# Patient Record
Sex: FEMALE | Race: WHITE | Employment: FULL TIME | URBAN - METROPOLITAN AREA
[De-identification: names, ages, dates, MRNs, and addresses within clinical notes are randomized per-mention and may not be internally consistent; named-entity substitution may affect disease eponyms.]

---

## 2018-03-20 ENCOUNTER — APPOINTMENT (OUTPATIENT)
Dept: CT IMAGING | Age: 73
End: 2018-03-20
Attending: EMERGENCY MEDICINE
Payer: MEDICARE

## 2018-03-20 ENCOUNTER — APPOINTMENT (OUTPATIENT)
Dept: GENERAL RADIOLOGY | Age: 73
End: 2018-03-20
Attending: EMERGENCY MEDICINE
Payer: MEDICARE

## 2018-03-20 ENCOUNTER — HOSPITAL ENCOUNTER (OUTPATIENT)
Age: 73
Setting detail: OBSERVATION
Discharge: HOME | End: 2018-03-23
Attending: EMERGENCY MEDICINE | Admitting: HOSPITALIST
Payer: MEDICARE

## 2018-03-20 ENCOUNTER — APPOINTMENT (OUTPATIENT)
Dept: ULTRASOUND IMAGING | Age: 73
End: 2018-03-20
Attending: HOSPITALIST
Payer: MEDICARE

## 2018-03-20 DIAGNOSIS — R07.9 CHEST PAIN, UNSPECIFIED TYPE: ICD-10-CM

## 2018-03-20 DIAGNOSIS — R09.02 HYPOXIA: Primary | ICD-10-CM

## 2018-03-20 DIAGNOSIS — R11.0 NAUSEA IN ADULT: ICD-10-CM

## 2018-03-20 DIAGNOSIS — R16.1 SPLENOMEGALY: ICD-10-CM

## 2018-03-20 DIAGNOSIS — R93.89 INFILTRATE NOTED ON IMAGING STUDY: ICD-10-CM

## 2018-03-20 DIAGNOSIS — E66.01 OBESITY, MORBID (HCC): ICD-10-CM

## 2018-03-20 DIAGNOSIS — R68.81 EARLY SATIETY: ICD-10-CM

## 2018-03-20 DIAGNOSIS — M19.90 ARTHRITIS: ICD-10-CM

## 2018-03-20 PROBLEM — R77.8 ELEVATED TROPONIN I LEVEL: Status: ACTIVE | Noted: 2018-03-20

## 2018-03-20 PROBLEM — J96.00 ACUTE RESPIRATORY FAILURE (HCC): Status: ACTIVE | Noted: 2018-03-20

## 2018-03-20 PROBLEM — J18.9 CAP (COMMUNITY ACQUIRED PNEUMONIA): Status: ACTIVE | Noted: 2018-03-20

## 2018-03-20 LAB
ALBUMIN SERPL-MCNC: 3.7 G/DL (ref 3.2–4.6)
ALBUMIN/GLOB SERPL: 1.2 {RATIO} (ref 1.2–3.5)
ALP SERPL-CCNC: 84 U/L (ref 50–136)
ALT SERPL-CCNC: 32 U/L (ref 12–65)
ANION GAP SERPL CALC-SCNC: 10 MMOL/L (ref 7–16)
ARTERIAL PATENCY WRIST A: POSITIVE
AST SERPL-CCNC: 33 U/L (ref 15–37)
ATRIAL RATE: 79 BPM
BASE DEFICIT BLDA-SCNC: 2 MMOL/L (ref 0–2)
BASOPHILS # BLD: 0 K/UL (ref 0–0.2)
BASOPHILS NFR BLD: 0 % (ref 0–2)
BDY SITE: ABNORMAL
BILIRUB SERPL-MCNC: 0.6 MG/DL (ref 0.2–1.1)
BNP SERPL-MCNC: 15 PG/ML
BUN SERPL-MCNC: 20 MG/DL (ref 8–23)
CALCIUM SERPL-MCNC: 9.4 MG/DL (ref 8.3–10.4)
CALCULATED P AXIS, ECG09: 72 DEGREES
CALCULATED R AXIS, ECG10: 73 DEGREES
CALCULATED T AXIS, ECG11: 58 DEGREES
CHLORIDE SERPL-SCNC: 104 MMOL/L (ref 98–107)
CO2 SERPL-SCNC: 24 MMOL/L (ref 21–32)
CREAT SERPL-MCNC: 0.66 MG/DL (ref 0.6–1)
D DIMER PPP FEU-MCNC: 1.1 UG/ML(FEU)
DIAGNOSIS, 93000: NORMAL
DIFFERENTIAL METHOD BLD: ABNORMAL
EOSINOPHIL # BLD: 0.3 K/UL (ref 0–0.8)
EOSINOPHIL NFR BLD: 6 % (ref 0.5–7.8)
ERYTHROCYTE [DISTWIDTH] IN BLOOD BY AUTOMATED COUNT: 14 % (ref 11.9–14.6)
FLUAV AG NPH QL IA: NEGATIVE
FLUBV AG NPH QL IA: NEGATIVE
GLOBULIN SER CALC-MCNC: 3 G/DL (ref 2.3–3.5)
GLUCOSE SERPL-MCNC: 139 MG/DL (ref 65–100)
HCO3 BLDA-SCNC: 22 MMOL/L (ref 22–26)
HCT VFR BLD AUTO: 38.4 % (ref 35.8–46.3)
HETEROPH AB SER QL: NEGATIVE
HGB BLD-MCNC: 12.5 G/DL (ref 11.7–15.4)
HIV1 P24 AG SERPL QL IA: NONREACTIVE
HIV1+2 AB SERPL QL IA: NONREACTIVE
IMM GRANULOCYTES # BLD: 0 K/UL (ref 0–0.5)
IMM GRANULOCYTES NFR BLD AUTO: 0 % (ref 0–5)
LIPASE SERPL-CCNC: 98 U/L (ref 73–393)
LYMPHOCYTES # BLD: 0.8 K/UL (ref 0.5–4.6)
LYMPHOCYTES NFR BLD: 17 % (ref 13–44)
MCH RBC QN AUTO: 26.5 PG (ref 26.1–32.9)
MCHC RBC AUTO-ENTMCNC: 32.6 G/DL (ref 31.4–35)
MCV RBC AUTO: 81.5 FL (ref 79.6–97.8)
MONOCYTES # BLD: 0.4 K/UL (ref 0.1–1.3)
MONOCYTES NFR BLD: 10 % (ref 4–12)
NEUTS SEG # BLD: 3 K/UL (ref 1.7–8.2)
NEUTS SEG NFR BLD: 67 % (ref 43–78)
P-R INTERVAL, ECG05: 158 MS
PCO2 BLDA: 36 MMHG (ref 35–45)
PH BLDA: 7.4 [PH] (ref 7.35–7.45)
PLATELET # BLD AUTO: 129 K/UL (ref 150–450)
PMV BLD AUTO: 10.6 FL (ref 10.8–14.1)
PO2 BLDA: 63 MMHG (ref 80–105)
POTASSIUM SERPL-SCNC: 4.1 MMOL/L (ref 3.5–5.1)
PROCALCITONIN SERPL-MCNC: <0.1 NG/ML
PROT SERPL-MCNC: 6.7 G/DL (ref 6.3–8.2)
Q-T INTERVAL, ECG07: 382 MS
QRS DURATION, ECG06: 88 MS
QTC CALCULATION (BEZET), ECG08: 438 MS
RBC # BLD AUTO: 4.71 M/UL (ref 4.05–5.25)
SERVICE CMNT-IMP: ABNORMAL
SODIUM SERPL-SCNC: 138 MMOL/L (ref 136–145)
TROPONIN I BLD-MCNC: 0 NG/ML (ref 0.02–0.05)
TROPONIN I BLD-MCNC: 0 NG/ML (ref 0.02–0.05)
TROPONIN I SERPL-MCNC: <0.04 NG/ML (ref 0.02–0.05)
TROPONIN I SERPL-MCNC: <0.04 NG/ML (ref 0.02–0.05)
VENTRICULAR RATE, ECG03: 79 BPM
WBC # BLD AUTO: 4.6 K/UL (ref 4.3–11.1)

## 2018-03-20 PROCEDURE — 74011636320 HC RX REV CODE- 636/320: Performed by: EMERGENCY MEDICINE

## 2018-03-20 PROCEDURE — 83690 ASSAY OF LIPASE: CPT | Performed by: EMERGENCY MEDICINE

## 2018-03-20 PROCEDURE — 77010033678 HC OXYGEN DAILY

## 2018-03-20 PROCEDURE — 74011250636 HC RX REV CODE- 250/636: Performed by: EMERGENCY MEDICINE

## 2018-03-20 PROCEDURE — 84484 ASSAY OF TROPONIN QUANT: CPT

## 2018-03-20 PROCEDURE — 92610 EVALUATE SWALLOWING FUNCTION: CPT

## 2018-03-20 PROCEDURE — 74011250636 HC RX REV CODE- 250/636: Performed by: HOSPITALIST

## 2018-03-20 PROCEDURE — 71045 X-RAY EXAM CHEST 1 VIEW: CPT

## 2018-03-20 PROCEDURE — 96365 THER/PROPH/DIAG IV INF INIT: CPT

## 2018-03-20 PROCEDURE — 93306 TTE W/DOPPLER COMPLETE: CPT

## 2018-03-20 PROCEDURE — 74011250637 HC RX REV CODE- 250/637: Performed by: HOSPITALIST

## 2018-03-20 PROCEDURE — 74011250637 HC RX REV CODE- 250/637: Performed by: EMERGENCY MEDICINE

## 2018-03-20 PROCEDURE — 99218 HC RM OBSERVATION: CPT

## 2018-03-20 PROCEDURE — 96372 THER/PROPH/DIAG INJ SC/IM: CPT

## 2018-03-20 PROCEDURE — 93005 ELECTROCARDIOGRAM TRACING: CPT | Performed by: EMERGENCY MEDICINE

## 2018-03-20 PROCEDURE — 74011000302 HC RX REV CODE- 302: Performed by: HOSPITALIST

## 2018-03-20 PROCEDURE — 93970 EXTREMITY STUDY: CPT

## 2018-03-20 PROCEDURE — 96374 THER/PROPH/DIAG INJ IV PUSH: CPT | Performed by: EMERGENCY MEDICINE

## 2018-03-20 PROCEDURE — 82803 BLOOD GASES ANY COMBINATION: CPT

## 2018-03-20 PROCEDURE — 81003 URINALYSIS AUTO W/O SCOPE: CPT | Performed by: EMERGENCY MEDICINE

## 2018-03-20 PROCEDURE — 87040 BLOOD CULTURE FOR BACTERIA: CPT | Performed by: EMERGENCY MEDICINE

## 2018-03-20 PROCEDURE — 84145 PROCALCITONIN (PCT): CPT | Performed by: HOSPITALIST

## 2018-03-20 PROCEDURE — 87804 INFLUENZA ASSAY W/OPTIC: CPT | Performed by: EMERGENCY MEDICINE

## 2018-03-20 PROCEDURE — 65390000012 HC CONDITION CODE 44 OBSERVATION

## 2018-03-20 PROCEDURE — G8998 SWALLOW D/C STATUS: HCPCS

## 2018-03-20 PROCEDURE — 86580 TB INTRADERMAL TEST: CPT | Performed by: HOSPITALIST

## 2018-03-20 PROCEDURE — 74011000258 HC RX REV CODE- 258: Performed by: EMERGENCY MEDICINE

## 2018-03-20 PROCEDURE — 96375 TX/PRO/DX INJ NEW DRUG ADDON: CPT | Performed by: EMERGENCY MEDICINE

## 2018-03-20 PROCEDURE — 87389 HIV-1 AG W/HIV-1&-2 AB AG IA: CPT | Performed by: HOSPITALIST

## 2018-03-20 PROCEDURE — 85379 FIBRIN DEGRADATION QUANT: CPT | Performed by: EMERGENCY MEDICINE

## 2018-03-20 PROCEDURE — 83880 ASSAY OF NATRIURETIC PEPTIDE: CPT | Performed by: EMERGENCY MEDICINE

## 2018-03-20 PROCEDURE — 80074 ACUTE HEPATITIS PANEL: CPT | Performed by: HOSPITALIST

## 2018-03-20 PROCEDURE — 99285 EMERGENCY DEPT VISIT HI MDM: CPT | Performed by: EMERGENCY MEDICINE

## 2018-03-20 PROCEDURE — 85025 COMPLETE CBC W/AUTO DIFF WBC: CPT | Performed by: EMERGENCY MEDICINE

## 2018-03-20 PROCEDURE — 86308 HETEROPHILE ANTIBODY SCREEN: CPT | Performed by: HOSPITALIST

## 2018-03-20 PROCEDURE — 94760 N-INVAS EAR/PLS OXIMETRY 1: CPT

## 2018-03-20 PROCEDURE — G8996 SWALLOW CURRENT STATUS: HCPCS

## 2018-03-20 PROCEDURE — 86644 CMV ANTIBODY: CPT | Performed by: HOSPITALIST

## 2018-03-20 PROCEDURE — 86738 MYCOPLASMA ANTIBODY: CPT | Performed by: HOSPITALIST

## 2018-03-20 PROCEDURE — 80053 COMPREHEN METABOLIC PANEL: CPT | Performed by: EMERGENCY MEDICINE

## 2018-03-20 PROCEDURE — 71260 CT THORAX DX C+: CPT

## 2018-03-20 PROCEDURE — 36600 WITHDRAWAL OF ARTERIAL BLOOD: CPT

## 2018-03-20 PROCEDURE — 84484 ASSAY OF TROPONIN QUANT: CPT | Performed by: HOSPITALIST

## 2018-03-20 PROCEDURE — 36415 COLL VENOUS BLD VENIPUNCTURE: CPT | Performed by: HOSPITALIST

## 2018-03-20 PROCEDURE — 76700 US EXAM ABDOM COMPLETE: CPT

## 2018-03-20 PROCEDURE — G8997 SWALLOW GOAL STATUS: HCPCS

## 2018-03-20 PROCEDURE — 96367 TX/PROPH/DG ADDL SEQ IV INF: CPT

## 2018-03-20 RX ORDER — ONDANSETRON 2 MG/ML
4 INJECTION INTRAMUSCULAR; INTRAVENOUS
Status: COMPLETED | OUTPATIENT
Start: 2018-03-20 | End: 2018-03-20

## 2018-03-20 RX ORDER — HYDROCODONE BITARTRATE AND ACETAMINOPHEN 5; 325 MG/1; MG/1
1 TABLET ORAL
Status: DISCONTINUED | OUTPATIENT
Start: 2018-03-20 | End: 2018-03-23 | Stop reason: HOSPADM

## 2018-03-20 RX ORDER — GUAIFENESIN 100 MG/5ML
324 LIQUID (ML) ORAL
Status: COMPLETED | OUTPATIENT
Start: 2018-03-20 | End: 2018-03-20

## 2018-03-20 RX ORDER — HEPARIN SODIUM 5000 [USP'U]/ML
5000 INJECTION, SOLUTION INTRAVENOUS; SUBCUTANEOUS EVERY 8 HOURS
Status: DISCONTINUED | OUTPATIENT
Start: 2018-03-20 | End: 2018-03-23 | Stop reason: HOSPADM

## 2018-03-20 RX ORDER — SODIUM CHLORIDE 0.9 % (FLUSH) 0.9 %
5-10 SYRINGE (ML) INJECTION EVERY 8 HOURS
Status: DISCONTINUED | OUTPATIENT
Start: 2018-03-20 | End: 2018-03-23 | Stop reason: HOSPADM

## 2018-03-20 RX ORDER — ONDANSETRON 2 MG/ML
4 INJECTION INTRAMUSCULAR; INTRAVENOUS
Status: DISCONTINUED | OUTPATIENT
Start: 2018-03-20 | End: 2018-03-23 | Stop reason: HOSPADM

## 2018-03-20 RX ORDER — GUAIFENESIN 600 MG/1
1200 TABLET, EXTENDED RELEASE ORAL EVERY 12 HOURS
Status: DISCONTINUED | OUTPATIENT
Start: 2018-03-20 | End: 2018-03-23 | Stop reason: HOSPADM

## 2018-03-20 RX ORDER — SODIUM CHLORIDE 0.9 % (FLUSH) 0.9 %
10 SYRINGE (ML) INJECTION
Status: COMPLETED | OUTPATIENT
Start: 2018-03-20 | End: 2018-03-20

## 2018-03-20 RX ORDER — NITROGLYCERIN 0.4 MG/1
0.4 TABLET SUBLINGUAL
Status: DISCONTINUED | OUTPATIENT
Start: 2018-03-20 | End: 2018-03-20

## 2018-03-20 RX ORDER — SODIUM CHLORIDE 0.9 % (FLUSH) 0.9 %
5-10 SYRINGE (ML) INJECTION AS NEEDED
Status: DISCONTINUED | OUTPATIENT
Start: 2018-03-20 | End: 2018-03-23 | Stop reason: HOSPADM

## 2018-03-20 RX ORDER — ACETAMINOPHEN 325 MG/1
650 TABLET ORAL
Status: DISCONTINUED | OUTPATIENT
Start: 2018-03-20 | End: 2018-03-23 | Stop reason: HOSPADM

## 2018-03-20 RX ADMIN — HEPARIN SODIUM 5000 UNITS: 5000 INJECTION, SOLUTION INTRAVENOUS; SUBCUTANEOUS at 21:10

## 2018-03-20 RX ADMIN — ONDANSETRON 4 MG: 2 INJECTION INTRAMUSCULAR; INTRAVENOUS at 06:45

## 2018-03-20 RX ADMIN — HEPARIN SODIUM 5000 UNITS: 5000 INJECTION, SOLUTION INTRAVENOUS; SUBCUTANEOUS at 13:18

## 2018-03-20 RX ADMIN — NITROGLYCERIN 0.5 INCH: 20 OINTMENT TOPICAL at 06:58

## 2018-03-20 RX ADMIN — TUBERCULIN PURIFIED PROTEIN DERIVATIVE 5 UNITS: 5 INJECTION, SOLUTION INTRADERMAL at 13:18

## 2018-03-20 RX ADMIN — AZITHROMYCIN MONOHYDRATE 500 MG: 500 INJECTION, POWDER, LYOPHILIZED, FOR SOLUTION INTRAVENOUS at 11:41

## 2018-03-20 RX ADMIN — SODIUM CHLORIDE 100 ML: 900 INJECTION, SOLUTION INTRAVENOUS at 08:27

## 2018-03-20 RX ADMIN — ASPIRIN 81 MG 324 MG: 81 TABLET ORAL at 06:47

## 2018-03-20 RX ADMIN — Medication 10 ML: at 13:18

## 2018-03-20 RX ADMIN — IOPAMIDOL 100 ML: 755 INJECTION, SOLUTION INTRAVENOUS at 08:27

## 2018-03-20 RX ADMIN — CEFTRIAXONE 1 G: 1 INJECTION, POWDER, FOR SOLUTION INTRAMUSCULAR; INTRAVENOUS at 10:48

## 2018-03-20 RX ADMIN — GUAIFENESIN 1200 MG: 600 TABLET, EXTENDED RELEASE ORAL at 13:17

## 2018-03-20 RX ADMIN — Medication 10 ML: at 21:10

## 2018-03-20 RX ADMIN — Medication 10 ML: at 08:28

## 2018-03-20 RX ADMIN — GUAIFENESIN 1200 MG: 600 TABLET, EXTENDED RELEASE ORAL at 21:09

## 2018-03-20 NOTE — ED NOTES
TRANSFER - OUT REPORT:    Verbal report given to Gabe Pierce on Alexus Barrios  being transferred to [facility] for routine progression of care       Report consisted of patients Situation, Background, Assessment and   Recommendations(SBAR). Information from the following report(s) SBAR, ED Summary and MAR was reviewed with the receiving nurse. Lines:   Peripheral IV 03/20/18 Right Antecubital (Active)   Site Assessment Clean, dry, & intact 3/20/2018  6:45 AM   Phlebitis Assessment 0 3/20/2018  6:45 AM   Infiltration Assessment 0 3/20/2018  6:45 AM   Dressing Status Clean, dry, & intact 3/20/2018  6:45 AM        Opportunity for questions and clarification was provided. Patient transported with:   Registered nurse, O2, and monitor.

## 2018-03-20 NOTE — PROGRESS NOTES
Case management at bedside to meet with patient and wife for a scheduled 11:00 meeting. Attempted to contact wife, no answer. Will contact son shortly if wife has not arrived.

## 2018-03-20 NOTE — H&P
HOSPITALIST H&P/CONSULT  NAME:  Pasco Holter   Age:  67 y.o.  :   1945   DOS:               18  MRN:   [id number]  PCP: Bernadette Bledsoe MD  Consulting MD:  Treatment Team: Attending Provider: Court Carl MD    HPI:   Ms. Priscila Mcmillan is a 68 yo F with PMHx of OA  And Obesity, although not on any medications at home who works at [employer] lab and presented to ED complaining of sudden right shoulder blade pain that lasted approx 15 min, 6/10 in intensity with radiation on her back and was associated with mild nausea. Complains laos of nonproductive cough for last month and some coughing spells with food intake. Complains laos om some SOB and occasional mild LE edema. Complained laos of chest heaviness, 5/10, w/o radiation that lasted less than 5 min. Denies any spikes of fever. ED work-up showed WBC: 4.6, Plts:126, BNP:15, Trop I:0, D-dimer:1.10. CXR w/o acute cardiopulmonary disease. CTA chest w/o PE, patchy airspace disease in the upper lung fields, possible pneumonitis and marked splenomegaly. Had hypoxia 88% on Ra and  Was placed on O2 supplementation. Started on Rocephin/Azithromycin. 10+ point ROS done and is negative except as noted in HPI.   Past Medical History:   Diagnosis Date    Arthritis     OA- knees    GERD (gastroesophageal reflux disease)     diet controlled - very rare    H/O detached retina repair     right eye    Macular hole of right eye     Nausea & vomiting     after anesthesia-none with  eye surgery-given benadryl in preop for post nasal drip with success    Obesity     Status post total left knee replacement 2015    Unspecified adverse effect of anesthesia     takes longer to wake- pt states low tolerance for medications      Past Surgical History:   Procedure Laterality Date    HX APPENDECTOMY      with hysterectomy    HX CATARACT REMOVAL  10/20/11    bilateral with lens implant    HX HYSTERECTOMY      partial hysterectomy & appendectomy    HX RETINAL DETACHMENT REPAIR      right side    HX RETINAL DETACHMENT REPAIR      macular hole repair right side     HX RETINAL DETACHMENT REPAIR      left macular hole 13    HX TUBAL LIGATION        None     Home meds reconciled. Allergies   Allergen Reactions    Augmentin [Amoxicillin-Pot Clavulanate] Nausea and Vomiting      Social History   Substance Use Topics    Smoking status: Former Smoker     Packs/day: 1.00     Years: 20.00     Quit date: 1984    Smokeless tobacco: Never Used    Alcohol use No      Family History   Problem Relation Age of Onset    Diabetes Mother      late onset type 2    Cancer Mother      kidney    Hypertension Father     Heart Disease Father      CABG    Cancer Brother      pancreatic, bowel      I personally reviewed home medications, social and family history.      Immunization History   Administered Date(s) Administered    Pneumococcal Polysaccharide (PPSV-23) 2015    TB Skin Test (PPD) Intradermal 2015     Objective:     Patient Vitals for the past 24 hrs:   Temp Pulse Resp BP SpO2   18 1025 97.8 °F (36.6 °C) 81 23 126/59 97 %   18 1004 - 73 23 - (!) 89 %   18 1000 - 75 17 117/60 90 %   18 0955 - 73 17 - 90 %   18 0916 - 76 17 - 91 %   18 0903 - 75 25 - (!) 88 %   18 0859 - 74 18 - (!) 89 %   18 0818 - 70 19 - (!) 89 %   18 0756 - 74 25 - 90 %   18 0720 - 80 20 112/53 93 %   18 0700 - 78 19 116/57 92 %   18 0658 - 92 - 115/58 -   18 0653 - 77 - 115/58 94 %   18 0633 97.9 °F (36.6 °C) 78 20 148/63 96 %     Temp (24hrs), Av.9 °F (36.6 °C), Min:97.8 °F (36.6 °C), Max:97.9 °F (36.6 °C)    Oxygen Therapy  O2 Sat (%): 97 % (18 1025)  Pulse via Oximetry: 75 beats per minute (18 1004)  O2 Device: Nasal cannula (2L) (18 1025)  Oxygen Therapy  O2 Sat (%): 97 % (18 1025)  Pulse via Oximetry: 75 beats per minute (18 1004)  O2 Device: Nasal cannula (2L) (03/20/18 1025)    Physical Exam:  General:         Alert, cooperative, no acute distress. Afebrile. Obese  HEENT:               NCAT. No obvious deformity. Nares normal. No drainage  Lungs:                      CTABL. No wheezing/rhonchi/rales  NC 2L  Cardiovascular:   RRR. No m/r/g. Trace/1+ pitting pedal edema b/l. +2 PT/DT pulses b/l. Abdomen:       S/nt/nd. Bowel sounds normal. .   Skin:         No rashes or lesions. Not Jaundiced  Neurologic:    AAOx3. CN II- XII grossly WNL. No gross focal deficit. Moves all extremities. Psychiatric:         Good mood. Normal affect. Denies any SI/HI.        Data Review:   Recent Results (from the past 24 hour(s))   EKG, 12 LEAD, INITIAL    Collection Time: 03/20/18  6:33 AM   Result Value Ref Range    Ventricular Rate 79 BPM    Atrial Rate 79 BPM    P-R Interval 158 ms    QRS Duration 88 ms    Q-T Interval 382 ms    QTC Calculation (Bezet) 438 ms    Calculated P Axis 72 degrees    Calculated R Axis 73 degrees    Calculated T Axis 58 degrees    Diagnosis       Normal sinus rhythm  RSR' or QR pattern in V1 suggests right ventricular conduction delay  Borderline ECG  When compared with ECG of 16-NOV-2015 14:02,  RSR' pattern in V1 is now Present  Confirmed by [facility] LEILANI OCHOA (), JOSE DE JESUS MAGALLANES ([id number]) on 3/20/2018 7:46:53 AM     POC TROPONIN-I    Collection Time: 03/20/18  6:39 AM   Result Value Ref Range    Troponin-I (POC) 0 (L) 0.02 - 0.05 ng/ml   BNP    Collection Time: 03/20/18  6:40 AM   Result Value Ref Range    BNP 15 pg/mL   CBC WITH AUTOMATED DIFF    Collection Time: 03/20/18  6:40 AM   Result Value Ref Range    WBC 4.6 4.3 - 11.1 K/uL    RBC 4.71 4.05 - 5.25 M/uL    HGB 12.5 11.7 - 15.4 g/dL    HCT 38.4 35.8 - 46.3 %    MCV 81.5 79.6 - 97.8 FL    MCH 26.5 26.1 - 32.9 PG    MCHC 32.6 31.4 - 35.0 g/dL    RDW 14.0 11.9 - 14.6 %    PLATELET 565 (L) 328 - 450 K/uL    MPV 10.6 (L) 10.8 - 14.1 FL    DF AUTOMATED      NEUTROPHILS 67 43 - 78 %    LYMPHOCYTES 17 13 - 44 % MONOCYTES 10 4.0 - 12.0 %    EOSINOPHILS 6 0.5 - 7.8 %    BASOPHILS 0 0.0 - 2.0 %    IMMATURE GRANULOCYTES 0 0.0 - 5.0 %    ABS. NEUTROPHILS 3.0 1.7 - 8.2 K/UL    ABS. LYMPHOCYTES 0.8 0.5 - 4.6 K/UL    ABS. MONOCYTES 0.4 0.1 - 1.3 K/UL    ABS. EOSINOPHILS 0.3 0.0 - 0.8 K/UL    ABS. BASOPHILS 0.0 0.0 - 0.2 K/UL    ABS. IMM. GRANS. 0.0 0.0 - 0.5 K/UL   METABOLIC PANEL, COMPREHENSIVE    Collection Time: 03/20/18  6:40 AM   Result Value Ref Range    Sodium 138 136 - 145 mmol/L    Potassium 4.1 3.5 - 5.1 mmol/L    Chloride 104 98 - 107 mmol/L    CO2 24 21 - 32 mmol/L    Anion gap 10 7 - 16 mmol/L    Glucose 139 (H) 65 - 100 mg/dL    BUN 20 8 - 23 MG/DL    Creatinine 0.66 0.6 - 1.0 MG/DL    GFR est AA >60 >60 ml/min/1.73m2    GFR est non-AA >60 >60 ml/min/1.73m2    Calcium 9.4 8.3 - 10.4 MG/DL    Bilirubin, total 0.6 0.2 - 1.1 MG/DL    ALT (SGPT) 32 12 - 65 U/L    AST (SGOT) 33 15 - 37 U/L    Alk. phosphatase 84 50 - 136 U/L    Protein, total 6.7 6.3 - 8.2 g/dL    Albumin 3.7 3.2 - 4.6 g/dL    Globulin 3.0 2.3 - 3.5 g/dL    A-G Ratio 1.2 1.2 - 3.5     LIPASE    Collection Time: 03/20/18  6:40 AM   Result Value Ref Range    Lipase 98 73 - 393 U/L   D DIMER    Collection Time: 03/20/18  7:28 AM   Result Value Ref Range    D DIMER 1.10 (HH) <0.56 ug/ml(FEU)   BLOOD GAS, ARTERIAL    Collection Time: 03/20/18  9:14 AM   Result Value Ref Range    pH 7.40 7.35 - 7.45      PCO2 36 35 - 45 mmHg    PO2 63 (L) 80 - 105 mmHg    BICARBONATE 22 22 - 26 mmol/L    BASE DEFICIT 2.0 0 - 2 mmol/L    SITE RR     ALLENS TEST POSITIVE      Respiratory comment: NA at 3 20 2018 9 21 58 AM. Not read back.     POC TROPONIN-I    Collection Time: 03/20/18  9:40 AM   Result Value Ref Range    Troponin-I (POC) 0 (L) 0.02 - 0.05 ng/ml   INFLUENZA A & B AG (RAPID TEST)    Collection Time: 03/20/18 10:47 AM   Result Value Ref Range    Influenza A Ag NEGATIVE  NEG      Influenza B Ag NEGATIVE  NEG       All Micro Results     Procedure Component Value Units Date/Time    INFLUENZA A & B AG (RAPID TEST) [[id number]] Collected:  03/20/18 1047    Order Status:  Completed Specimen:  Nasopharyngeal from Nasal washing Updated:  03/20/18 1112     Influenza A Ag NEGATIVE          NEGATIVE FOR THE PRESENCE OF INFLUENZA A ANTIGEN  INFECTION DUE TO INFLUENZA A CANNOT BE RULED OUT. BECAUSE THE ANTIGEN PRESENT IN THE SAMPLE MAY BE BELOW  THE DETECTION LIMIT OF THE TEST. A NEGATIVE TEST IS PRESUMPTIVE AND IT IS RECOMMENDED THAT THESE RESULTS BE CONFIRMED BY VIRAL CULTURE OR AN FDA-CLEARED INFLUENZA A AND B MOLECULAR ASSAY. Influenza B Ag NEGATIVE          NEGATIVE FOR THE PRESENCE OF INFLUENZA B ANTIGEN  INFECTION DUE TO INFLUENZA B CANNOT BE RULED OUT. BECAUSE THE ANTIGEN PRESENT IN THE SAMPLE MAY BE BELOW  THE DETECTION LIMIT OF THE TEST. A NEGATIVE TEST IS PRESUMPTIVE AND IT IS RECOMMENDED THAT THESE RESULTS BE CONFIRMED BY VIRAL CULTURE OR AN FDA-CLEARED INFLUENZA A AND B MOLECULAR ASSAY. CULTURE, BLOOD [[id number]] Collected:  03/20/18 1047    Order Status:  Completed Specimen:  Whole Blood from Blood Updated:  03/20/18 1058    CULTURE, BLOOD [[id number]] Collected:  03/20/18 1031    Order Status:  Completed Specimen:  Whole Blood from Blood Updated:  03/20/18 1037          Imaging [facility] [employer] /Studies:  I personally reviewed all labs, imaging, and other studies this admission:  CXR Results  (Last 48 hours)               03/20/18 0645  XR CHEST PORT Final result    Impression:  IMPRESSION: No acute cardiopulmonary disease. Narrative:  EXAM:  XR CHEST PORT       INDICATION:  cp       COMPARISON:  11/19/2015       FINDINGS: A portable AP radiograph of the chest was obtained at 0647 hours. Calcified granuloma in the right lower lobe. The lungs are clear. The cardiac   and mediastinal contours and pulmonary vascularity are normal.  The bones and   soft tissues are grossly within normal limits.                 CT Results  (Last 48 hours) 03/20/18 0833  CT CHEST W CONT Final result    Impression:  IMPRESSION:     1. No CT evidence of pulmonary embolus. 2.  Patchy airspace disease in the upper lung fields, possible pneumonitis. 3.  Marked splenomegaly. Narrative:  Chest CT       INDICATION:  Chest pain and shortness of breath, elevated d-dimer       Multiple axial images were obtained through the chest during intravenous   infusion of 100mL of Isovue 370. Coronal reformats were also evaluated. Radiation dose reduction techniques were used for this study: All CT scans   performed at this facility use one or all of the following: Automated exposure   control, adjustment of the mA and/or kVp according to patient's size, iterative   reconstruction. FINDINGS: There is normal enhancement of the major pulmonary vessels. There is   no CT evidence of pulmonary embolus. There is also normal enhancement of the   aorta. There is no dissection or aneurysm. There is minimal patchy groundglass density in the upper lung fields. There are   no pulmonary masses. There are no effusions. There is no significant   mediastinal or axillary adenopathy. There are no bony lesions. There is   splenomegaly. The spleen measures at least 16 cm in length. No results found for this visit on 03/20/18. Assessment and Plan: Active Hospital Problems    Diagnosis Date Noted    Acute respiratory failure ([address] 03/20/2018    Elevated troponin I level 03/20/2018    Chest pain 03/20/2018    CAP (community acquired pneumonia) 03/20/2018    Splenomegaly 03/20/2018    GERD (gastroesophageal reflux disease)      diet controlled - very rare      Obesity, morbid ([address] 12/26/2009       PLAN  Acute respiratory failure:  - unclear etiology, although likely due to CAP  - BNP low, although with LE edema dn marked splenomegaly - will get Echocardiogram   - O2 supplementation, wean off.       CAP:  - on Rocephin/Azythromycin  - follow blood/sputum cultures  - speech evaluation to r/o aspiration due to hx of cough after eating      Chest pain:  - serial Trop I/ EKG  - initial Trop I/EKG. w/o acute pathology  -remote telemetry  - ASa 81 mg    Splenomegaly:  - massive   - unclear etiology  - check Mycoplasma, hepatitis panel, CMP, HIV (hx of possible exposure due to work enviroment), PPD, PT/INR  - check Echocardiogram  - US abdomen      GERD:  - denies - monitor      Obesity  - educated       DVT ppx: Heparin SQ    Code status: Full CODE   Estimated LOS:  2-3 days  Risk assessment:  high  Plan of care discussed with: patient. Care team.  Ms. Hortencia Sánchez will need at least 2 midnights of admission.       Bhumika Fiore MD  03/20/18

## 2018-03-20 NOTE — IP AVS SNAPSHOT
[address] 
[phone] Patient: Oralia Steele MRN: [id number] About your hospitalization You were admitted on:  March 20, 2018 You last received care in the:  [facility] You were discharged on:  March 23, 2018 Why you were hospitalized Your primary diagnosis was:  Acute Respiratory Failure (Hcc) Your diagnoses also included:  Gerd (Gastroesophageal Reflux Disease), Obesity, Morbid (Hcc), Obesity, Chest Pain, Cap (Community Acquired Pneumonia), Splenomegaly Follow-up Information Follow up With Details Comments Contact Info Albertina Sotelo MD In 3 days Dr. Devin Rodriguez MD. Repeat CBC and CMP,OFFICE WILL CALL PATIENT WITH APPT. 1520 [facility] Oscar Goyal [phone] Lew Reyes MD In 1 week OFFICE WILL CALL PATIENT WITH APPT. [address] [address] 
[phone] Patrica Sheppard MD In 5 days chest pain. needs stress test,APPT. MAR. 28th @ 2:[address]  [address] [facility] [address] 
[phone] Your Scheduled Appointments Wednesday March 28, 2018  2:15 PM EDT New Patient with Patrica Sheppard MD  
[facility] ([address] [place] [address]) 2 [facility] 
[address] [facility]  
[phone] Discharge Orders None A check saran indicates which time of day the medication should be taken. My Medications START taking these medications Instructions Each Dose to Equal  
 Morning Noon Evening Bedtime  
 aspirin 81 mg chewable tablet Your last dose was: Your next dose is: Take 1 Tab by mouth daily. 81 mg  
    
   
   
   
  
 azithromycin 250 mg tablet Commonly known as:  Francisco J Jeanine Your last dose was: Your next dose is: Take 2 Tabs by mouth daily for 3 days. 500 mg  
    
   
   
   
  
 cefdinir 300 mg capsule Commonly known as:  OMNICEF Your last dose was: Your next dose is: Take 1 Cap by mouth two (2) times a day for 5 days. 300 mg  
    
   
   
   
  
 guaiFENesin 1,200 mg Ta12 ER tablet Commonly known as:  Eric & Eric Your last dose was: Your next dose is: Take 1 Tab by mouth every twelve (12) hours for 5 days. 1200 mg  
    
   
   
   
  
 raNITIdine 150 mg tablet Commonly known as:  ZANTAC Your last dose was: Your next dose is: Take 1 Tab by mouth two (2) times a day. 150 mg Where to Get Your Medications Information on where to get these meds will be given to you by the nurse or doctor. ! Ask your nurse or doctor about these medications  
  aspirin 81 mg chewable tablet  
 azithromycin 250 mg tablet  
 cefdinir 300 mg capsule  
 guaiFENesin 1,200 mg Ta12 ER tablet  
 raNITIdine 150 mg tablet Discharge Instructions DISCHARGE SUMMARY from Nurse PATIENT INSTRUCTIONS: 
 
After general anesthesia or intravenous sedation, for 24 hours or while taking prescription Narcotics: · Limit your activities · Do not drive and operate hazardous machinery · Do not make important personal or business decisions · Do  not drink alcoholic beverages · If you have not urinated within 8 hours after discharge, please contact your surgeon on call. Report the following to your surgeon: 
· Excessive pain, swelling, redness or odor of or around the surgical area · Temperature over 100.5 · Nausea and vomiting lasting longer than 4 hours or if unable to take medications · Any signs of decreased circulation or nerve impairment to extremity: change in color, persistent  numbness, tingling, coldness or increase pain · Any questions What to do at Home: Recommended activity: Activity as tolerated, If you experience any of the above symptoms, please follow up with MD. 
 
*  Please give a list of your current medications to your Primary Care Provider. *  Please update this list whenever your medications are discontinued, doses are 
    changed, or new medications (including over-the-counter products) are added. *  Please carry medication information at all times in case of emergency situations. These are general instructions for a healthy lifestyle: No smoking/ No tobacco products/ Avoid exposure to second hand smoke Surgeon General's Warning:  Quitting smoking now greatly reduces serious risk to your health. Obesity, smoking, and sedentary lifestyle greatly increases your risk for illness A healthy diet, regular physical exercise & weight monitoring are important for maintaining a healthy lifestyle You may be retaining fluid if you have a history of heart failure or if you experience any of the following symptoms:  Weight gain of 3 pounds or more overnight or 5 pounds in a week, increased swelling in our hands or feet or shortness of breath while lying flat in bed. Please call your doctor as soon as you notice any of these symptoms; do not wait until your next office visit. Recognize signs and symptoms of STROKE: 
 
F-face looks uneven A-arms unable to move or move unevenly S-speech slurred or non-existent T-time-call 911 as soon as signs and symptoms begin-DO NOT go Back to bed or wait to see if you get better-TIME IS BRAIN. Warning Signs of HEART ATTACK Call 911 if you have these symptoms: 
? Chest discomfort. Most heart attacks involve discomfort in the center of the chest that lasts more than a few minutes, or that goes away and comes back. It can feel like uncomfortable pressure, squeezing, fullness, or pain. ? Discomfort in other areas of the upper body.  Symptoms can include pain or discomfort in one or both arms, the back, neck, jaw, or stomach. ? Shortness of breath with or without chest discomfort. ? Other signs may include breaking out in a cold sweat, nausea, or lightheadedness. Don't wait more than five minutes to call [address]! Fast action can save your life. Calling 911 is almost always the fastest way to get lifesaving treatment. Emergency Medical Services staff can begin treatment when they arrive  up to an hour sooner than if someone gets to the hospital by car. The discharge information has been reviewed with the patient. The patient verbalized understanding. Discharge medications reviewed with the patient and appropriate educational materials and side effects teaching were provided. ___________________________________________________________________________________________________________________________________Finish full course of azithromycin and Cefdinir Follow-up with PCP in 3-5 days for further work-up for your splenomegaly. Repeat CBC and CMP at that time. Toxoplasma, TB gold pending at the time of discharge Follow-up w1th hematology in 1 week. Multiple laboratory studies pending - discussed the results with them . Fifish 24h urine collection and bring it to the lab. Avoid strenuous exercise of contact sports Chest Pain: Care Instructions Your Care Instructions There are many things that can cause chest pain. Some are not serious and will get better on their own in a few days. But some kinds of chest pain need more testing and treatment. Your doctor may have recommended a follow-up visit in the next 8 to 12 hours. If you are not getting better, you may need more tests or treatment. Even though your doctor has released you, you still need to watch for any problems. The doctor carefully checked you, but sometimes problems can develop later. If you have new symptoms or if your symptoms do not get better, get medical care right away. If you have worse or different chest pain or pressure that lasts more than 5 minutes or you passed out (lost consciousness), call 911 or seek other emergency help right away. A medical visit is only one step in your treatment. Even if you feel better, you still need to do what your doctor recommends, such as going to all suggested follow-up appointments and taking medicines exactly as directed. This will help you recover and help prevent future problems. How can you care for yourself at home? · Rest until you feel better. · Take your medicine exactly as prescribed. Call your doctor if you think you are having a problem with your medicine. · Do not drive after taking a prescription pain medicine. When should you call for help? Call 911 if: 
? · You passed out (lost consciousness). ? · You have severe difficulty breathing. ? · You have symptoms of a heart attack. These may include: ¨ Chest pain or pressure, or a strange feeling in your chest. 
¨ Sweating. ¨ Shortness of breath. ¨ Nausea or vomiting. ¨ Pain, pressure, or a strange feeling in your back, neck, jaw, or upper belly or in one or both shoulders or arms. ¨ Lightheadedness or sudden weakness. ¨ A fast or irregular heartbeat. After you call 911, the  may tell you to chew 1 adult-strength or 2 to 4 low-dose aspirin. Wait for an ambulance. Do not try to drive yourself. ?Call your doctor today if: 
? · You have any trouble breathing. ? · Your chest pain gets worse. ? · You are dizzy or lightheaded, or you feel like you may faint. ? · You are not getting better as expected. ? · You are having new or different chest pain. Where can you learn more? Go to http://jyoti-david.info/. Enter A120 in the search box to learn more about \"Chest Pain: Care Instructions. \" Current as of: March 20, 2017 Content Version: 11.4 © 5491-6401 Healthwise, Incorporated.  Care instructions adapted under license by Lucina Weber (which disclaims liability or warranty for this information). If you have questions about a medical condition or this instruction, always ask your healthcare professional. [address] any warranty or liability for your use of this information. [employer] Announcement We are excited to announce that we are making your provider's discharge notes available to you in [employer]. You will see these notes when they are completed and signed by the physician that discharged you from your recent hospital stay. If you have any questions or concerns about any information you see in [employer], please call the Health Information Department where you were seen or reach out to your Primary Care Provider for more information about your plan of care. Introducing [facility]! [employer] introduces [employer] patient portal. Now you can access parts of your medical record, email your doctor's office, and request medication refills online. 1. In your internet browser, go to [email or web address] 2. Click on the First Time User? Click Here link in the Sign In box. You will see the New Member Sign Up page. 3. Enter your [employer] Access Code exactly as it appears below. You will not need to use this code after youve completed the sign-up process. If you do not sign up before the expiration date, you must request a new code. · [employer] Access Code: [email or web address] Expires: 6/19/2018  9:26 AM 
 
4. Enter the last four digits of your Social Security Number (xxxx) and Date of Birth (mm/dd/yyyy) as indicated and click Submit. You will be taken to the next sign-up page. 5. Create a [employer] ID. This will be your [employer] login ID and cannot be changed, so think of one that is secure and easy to remember. 6. Create a [employer] password. You can change your password at any time. 7. Enter your Password Reset Question and Answer. This can be used at a later time if you forget your password. 8. Enter your e-mail address. You will receive e-mail notification when new information is available in [address] 9. Click Sign Up. You can now view and download portions of your medical record. 10. Click the Download Summary menu link to download a portable copy of your medical information. If you have questions, please visit the Frequently Asked Questions section of the [employer] website. Remember, [employer] is NOT to be used for urgent needs. For medical emergencies, dial 911. Now available from your iPhone and Android! Unresulted Labs-Please follow up with your PCP about these lab tests Order Current Status BCR-ABL1, PCR, QT In process QUANTIFERON TB GOLD In process US ABD LTD In process CULTURE, BLOOD Preliminary result CULTURE, BLOOD Preliminary result MISC. LAB TEST Preliminary result PROTEIN ELEC WITH JONG, SERUM Preliminary result TOXOPLASMA GONDII AB, IGG/IGM, QT Preliminary result Providers Seen During Your Hospitalization Provider Specialty Primary office phone Marion Joyce MD Emergency Medicine [phone] Darling Campbell MD Emergency Medicine [phone] Mary Auguste MD [facility] [phone] Immunizations Administered for This Admission Name Date Influenza Vaccine (Quad) PF  Deferred () TB Skin Test (PPD) Intradermal  Deferred (), 3/20/2018 Your Primary Care Physician (PCP) Primary Care Physician Office Phone Office Fax OTHER, PHYS ** None ** ** None ** You are allergic to the following Allergen Reactions Augmentin (Amoxicillin-Pot Clavulanate) Nausea and Vomiting Recent Documentation Height Weight Breastfeeding? BMI OB Status Smoking Status 1.6 m 110.1 kg No 42.99 kg/m2 Hysterectomy Former Smoker Emergency Contacts Name Discharge Info Relation Home Work Mobile Suleiman Dhillon  Spouse [3] [phone] Patient Belongings The following personal items are in your possession at time of discharge: 
  Dental Appliances: With patient, Uppers, Partials (uppers, partial lower)  Visual Aid: Glasses, With patient      Home Medications: None   Jewelry: Watch, With patient  Clothing: Footwear, Pants, Shirt, Undergarments, At bedside    Other Valuables: Cell Phone, At bedside Discharge Instructions Attachments/References PNEUMONIA (ENGLISH) HAND-WASHING (ENGLISH) SECONDHAND SMOKE (ENGLISH) Patient Handouts Pneumonia: Care Instructions Your Care Instructions Pneumonia is an infection of the lungs. Most cases are caused by infections from bacteria or viruses. Pneumonia may be mild or very severe. If it is caused by bacteria, you will be treated with antibiotics. It may take a few weeks to a few months to recover fully from pneumonia, depending on how sick you were and whether your overall health is good. Follow-up care is a key part of your treatment and safety. Be sure to make and go to all appointments, and call your doctor if you are having problems. It's also a good idea to know your test results and keep a list of the medicines you take. How can you care for yourself at home? · Take your antibiotics exactly as directed. Do not stop taking the medicine just because you are feeling better. You need to take the full course of antibiotics. · Take your medicines exactly as prescribed. Call your doctor if you think you are having a problem with your medicine. · Get plenty of rest and sleep. You may feel weak and tired for a while, but your energy level will improve with time. · To prevent dehydration, drink plenty of fluids, enough so that your urine is light yellow or clear like water. Choose water and other caffeine-free clear liquids until you feel better.  If you have kidney, heart, or liver disease and have to limit fluids, talk with your doctor before you increase the amount of fluids you drink. · Take care of your cough so you can rest. A cough that brings up mucus from your lungs is common with pneumonia. It is one way your body gets rid of the infection. But if coughing keeps you from resting or causes severe fatigue and chest-wall pain, talk to your doctor. He or she may suggest that you take a medicine to reduce the cough. · Use a vaporizer or humidifier to add moisture to your bedroom. Follow the directions for cleaning the machine. · Do not smoke or allow others to smoke around you. Smoke will make your cough last longer. If you need help quitting, talk to your doctor about stop-smoking programs and medicines. These can increase your chances of quitting for good. · Take an over-the-counter pain medicine, such as acetaminophen (Tylenol), ibuprofen (Advil, Motrin), or naproxen (Aleve). Read and follow all instructions on the label. · Do not take two or more pain medicines at the same time unless the doctor told you to. Many pain medicines have acetaminophen, which is Tylenol. Too much acetaminophen (Tylenol) can be harmful. · If you were given a spirometer to measure how well your lungs are working, use it as instructed. This can help your doctor tell how your recovery is going. · To prevent pneumonia in the future, talk to your doctor about getting a flu vaccine (once a year) and a pneumococcal vaccine (one time only for most people). When should you call for help? Call 911 anytime you think you may need emergency care. For example, call if: 
? · You have severe trouble breathing. ?Call your doctor now or seek immediate medical care if: 
? · You cough up dark brown or bloody mucus (sputum). ? · You have new or worse trouble breathing. ? · You are dizzy or lightheaded, or you feel like you may faint. ?Watch closely for changes in your health, and be sure to contact your doctor if: 
? · You have a new or higher fever. ? · You are coughing more deeply or more often. ? · You are not getting better after 2 days (48 hours). ? · You do not get better as expected. Where can you learn more? Go to http://jyoti-david.info/. Enter [id number] in the search box to learn more about \"Pneumonia: Care Instructions. \" Current as of: May 12, 2017 Content Version: 11.4 © 2640-2436 [employer]. Care instructions adapted under license by [employer] (which disclaims liability or warranty for this information). If you have questions about a medical condition or this instruction, always ask your healthcare professional. [address] any warranty or liability for your use of this information. Hand-Washing: Care Instructions Your Care Instructions It is important for caregivers to wash their hands properly. This is the single best way to prevent the spread of infections. Hand-washing can help keep you from getting sick. It is easy, doesn't cost much, and it works. Make sure that you and your caregivers follow safe hand-washing routines. Caregivers may include health care workers or family members at home or in a care facility. You can talk to them about this information on hand-washing. Follow-up care is a key part of your treatment and safety. Be sure to make and go to all appointments, and call your doctor if you are having problems. It's also a good idea to know your test results and keep a list of the medicines you take. How can you care for yourself at home? · Caregivers should wash their hands with soap and water: ¨ When their hands are dirty, especially after being exposed to body fluids. This includes blood. ¨ When their hands may have been exposed to germs that could spread infection. ¨ After they touch broken skin, sores, or wound bandages. ¨ After they use the bathroom. · At other times, caregivers can use an alcohol-based gel  or soap and water to clean hands. This should be done: ¨ Before and after any contact with you. ¨ After they take off gloves. ¨ Before they handle a device that touches your body (even if gloves are used). ¨ After they touch any objects near you, such as medical equipment, lights, or doorknobs. ¨ Before they handle medicine or prepare food. Proper hand-washing for caregivers · When using an alcohol-based gel , fill your palm with the gel. Then spread it all over your hands. Rub your hands together until they are dry. · When washing hands with soap and water: ¨ Wet your hands with running water, and apply soap. ¨ Rub your hands together to make a lather. Scrub well for at least 20 seconds. ¨ Pay special attention to your wrists, the backs of your hands, between your fingers, and under your fingernails. ¨ Rinse your hands well under running water. ¨ Use a clean towel to dry your hands, or air-dry your hands. You may want to use a clean towel as a barrier between the faucet and your clean hands when you turn off the water. · If you use bar soap, use small bars. Set the soap on a rack that lets water drain. Where can you learn more? Go to http://jyoti-david.info/. Enter A128 in the search box to learn more about \"Hand-Washing: Care Instructions. \" Current as of: March 3, 2017 Content Version: 11.4 © 0196-2596 [employer]. Care instructions adapted under license by [employer] (which disclaims liability or warranty for this information). If you have questions about a medical condition or this instruction, always ask your healthcare professional. [place] [address] any warranty or liability for your use of this information. Secondhand Smoke: Care Instructions Your Care Instructions Secondhand smoke comes from the burning end of a cigarette, cigar, or pipe and the smoke that a smoker exhales. The smoke contains nicotine and many other harmful chemicals. Breathing secondhand smoke can cause or worsen health problems including cancer, asthma, coronary artery disease, and respiratory infections. It can make your eyes and nose burn and cause a sore throat. Secondhand smoke is especially bad for babies and young children whose lungs are still developing. Babies whose parents smoke are more likely to have ear infections, pneumonia, and bronchitis in the first few years of their lives. Secondhand smoke can make asthma symptoms worse in children. If you are pregnant, it is important that you not smoke and that you avoid secondhand smoke. You are more likely to give birth to a baby who weighs less than expected (low birth weight) if you smoke. And your baby may have a greater risk for sudden infant death syndrome (SIDS). Babies whose mothers are exposed to secondhand smoke during pregnancy have a higher risk for health problems. Follow-up care is a key part of your treatment and safety. Be sure to make and go to all appointments, and call your doctor if you are having problems. It's also a good idea to know your test results and keep a list of the medicines you take. How can you care for yourself at home? · Do not smoke or let anyone else smoke in your home. If people must smoke, ask them to go outside. · If people do smoke in your home, choose a room where you can open a window or use a fan to get the smoke outside. · Do not let anyone smoke in your car. If someone must smoke, pull over in a safe place and let him or her smoke away from the car. · Ask your employer to make sure that you have a smoke-free work area. · Make sure that your children are not exposed to secondhand smoke at day care, school, and after-school programs. · Try to choose nonsmoking bars, restaurants, and other public places when you go out. · Help your family and friends who smoke to quit by encouraging them to try. Tell them about treatment resources. Having support from others often helps. · If you smoke, quit. Quitting is hard, but there are ways to boost your chance of quitting tobacco for good. ¨ Use nicotine gum, patches, or lozenges. Call a quitline. Ask your doctor about stop-smoking programs and medicines. ¨ Keep trying. When should you call for help? Watch closely for changes in your health, and be sure to contact your doctor if you have any problems. Where can you learn more? Go to http://jyoti-david.info/. Enter L004 in the search box to learn more about \"Secondhand Smoke: Care Instructions. \" Current as of: March 20, 2017 Content Version: 11.4 © 6282-5245 Healthwise, [employer]. Care instructions adapted under license by [employer] (which disclaims liability or warranty for this information). If you have questions about a medical condition or this instruction, always ask your healthcare professional. [address] any warranty or liability for your use of this information. Please provide this summary of care documentation to your next provider. Signatures-by signing, you are acknowledging that this After Visit Summary has been reviewed with you and you have received a copy. Patient Signature:  ____________________________________________________________ Date:  ____________________________________________________________  
  
Bradley Miguel Provider Signature:  ____________________________________________________________ Date:  ____________________________________________________________

## 2018-03-20 NOTE — PHYSICIAN ADVISORY
Letter of Determination:  Outpatient status receiving Observation Services    This patient was originally hospitalized as Inpatient Status on 3/20/2018 for pneumonitis. At this time this patient does not appear to meet the medical necessity requirements in CMS regulation Section 43 .3 to support an inpatient level of care. It is our recommendation that this patient's hospitalization status should be changed from INPATIENT to [place] receiving OBSERVATION services via Condition Code 44.      This may change due to the medical condition of the patient and new clinical evidence as the patients care progreses. The final decision regarding the patient's hospitalization status depends on the attending physician's judgement.     Josue Chapin MD, [place]ING[place],   Physician Henri Thurston.

## 2018-03-20 NOTE — ED PROVIDER NOTES
HPI Comments: 2 AM patient had some right shoulder blade pain. 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 Had some mild nausea at that time. Just prior to arrival here a little after 6:00 AM patient had some chest heaviness with increased nausea. That has somewhat improved but still present. Has mild shortness of breath. No numbness or diaphoresis. No prior similar episodes. Patient is a 67 y.o. female presenting with chest pain. The history is provided by the patient. No  was used. Chest Pain (Angina)    This is a new problem. The current episode started less than 1 hour ago. The problem has not changed since onset. The problem occurs constantly. The pain is associated with normal activity. Pain location: sternal. The pain is mild. The quality of the pain is described as heavy. The pain does not radiate. Associated symptoms include nausea and shortness of breath. Pertinent negatives include no abdominal pain, no back pain, no cough, no diaphoresis, no dizziness, no exertional chest pressure, no fever, no headaches, no irregular heartbeat, no leg pain, no lower extremity edema, no malaise/fatigue, no numbness, no palpitations, no vomiting and no weakness. She has tried nothing for the symptoms. Her past medical history does not include DM or HTN.         Past Medical History:   Diagnosis Date    Arthritis     OA- knees    GERD (gastroesophageal reflux disease)     diet controlled - very rare    H/O detached retina repair     right eye    Macular hole of right eye     Nausea & vomiting     after anesthesia-none with 2/13 eye surgery-given benadryl in preop for post nasal drip with success    Obesity     Status post total left knee replacement 12/7/2015    Unspecified adverse effect of anesthesia     takes longer to wake- pt states low tolerance for medications       Past Surgical History:   Procedure Laterality Date    HX APPENDECTOMY  1980's    with hysterectomy    HX CATARACT REMOVAL 10/20/11    bilateral with lens implant    HX HYSTERECTOMY  1980's    partial hysterectomy & appendectomy    HX RETINAL DETACHMENT REPAIR      right side    HX RETINAL DETACHMENT REPAIR      macular hole repair right side     HX RETINAL DETACHMENT REPAIR      left macular hole 2/6/13    HX TUBAL LIGATION  1980's         Family History:   Problem Relation Age of Onset    Diabetes Mother      late onset type 2    Cancer Mother      kidney    Hypertension Father     Heart Disease Father      CABG    Cancer Brother      pancreatic, bowel       Social History     Social History    Marital status:      Spouse name: N/A    Number of children: N/A    Years of education: N/A     Occupational History    Not on file. Social History Main Topics    Smoking status: Former Smoker     Packs/day: 1.00     Years: 20.00     Quit date: 12/26/1984    Smokeless tobacco: Never Used    Alcohol use No    Drug use: No    Sexual activity: Not on file     Other Topics Concern    Not on file     Social History Narrative    , lives with . She works as  at [employer]. ALLERGIES: Augmentin [amoxicillin-pot clavulanate]    Review of Systems   Constitutional: Negative for chills, diaphoresis, fever and malaise/fatigue. HENT: Negative for rhinorrhea and sore throat. Eyes: Negative for pain and redness. Respiratory: Positive for shortness of breath. Negative for cough, chest tightness and wheezing. Cardiovascular: Positive for chest pain. Negative for palpitations and leg swelling. Gastrointestinal: Positive for nausea. Negative for abdominal pain, diarrhea and vomiting. Genitourinary: Negative for dysuria and hematuria. Musculoskeletal: Negative for back pain, gait problem, neck pain and neck stiffness. Skin: Negative for color change and rash. Neurological: Negative for dizziness, weakness, numbness and headaches. There were no vitals filed for this visit. Physical Exam   Constitutional: She is oriented to person, place, and time. She appears well-developed and well-nourished. HENT:   Head: Normocephalic and atraumatic. Neck: Normal range of motion. Neck supple. Cardiovascular: Normal rate and regular rhythm. No murmur heard. Pulmonary/Chest: Effort normal and breath sounds normal. She has no wheezes. She exhibits no tenderness. Abdominal: Soft. Bowel sounds are normal. There is no tenderness. Musculoskeletal: Normal range of motion. She exhibits edema (mild bilateral). Neurological: She is alert and oriented to person, place, and time. Skin: Skin is warm and dry. Nursing note and vitals reviewed. MDM  Number of Diagnoses or Management Options  Diagnosis management comments: Will have on coming doctor follow up on results. 10:06 AM  CT scan for PE was negative. 2 troponins was negative. BNP was negative. However, patient continues to be hypoxic on room air so I will plan to admit her for observation. Amount and/or Complexity of Data Reviewed  Clinical lab tests: ordered and reviewed  Tests in the radiology section of CPT®: ordered and reviewed  Tests in the medicine section of CPT®: ordered and reviewed    Patient Progress  Patient progress: stable        ED Course       Procedures      EKG: normal sinus rhythm, nonspecific ST and T waves changes. Rate 79.

## 2018-03-20 NOTE — PROGRESS NOTES
Speech language pathology: bedside swallow note: Initial Assessment and Discharge    NAME/AGE/GENDER: Jessica Prado is a 67 y.o. female  DATE: 3/20/2018  PRIMARY DIAGNOSIS: Acute respiratory failure ([address]       ICD-10: Treatment Diagnosis: R13.12 Oropharyngeal Dysphagia. INTERDISCIPLINARY COLLABORATION: Registered Nurse  PRECAUTIONS/ALLERGIES: Augmentin [amoxicillin-pot clavulanate] ASSESSMENT:Based on the objective data described below, Ms. Giselle Moise presents with swallow function that is within normal limits. Per chart review, patient reported coughing when eating food. However, during initial interview with clinician, patient denies coughing with po intake. She states she will have an occasional cough at end of meal, but does not feel it is related to po intake. She has history of GERD. Patient tolerated thin liquid via serial cup and straw sips, puree, mixed, and solid consistencies. Appropriate oral prep and timely swallow initiation with all trials. No oral residue with puree or chewable textures. No overt signs or symptoms of airway compromise noted  Recommend regular diet and thin liquids. Medications whole with liquid wash. No further speech therapy indicated as swallow function is within normal limits. ?????? ? ? This section established at most recent assessment??????????  PROBLEM LIST (Impairments causing functional limitations):  1. Oropharyngeal dysphagia- no symptoms identified  REHABILITATION POTENTIAL FOR STATED GOALS: Excellent  PLAN OF CARE:   Patient will benefit from skilled intervention to address the following impairments.   RECOMMENDATIONS AND PLANNED INTERVENTIONS (Benefits and precautions of therapy have been discussed with the patient.):  · PO:  Regular  · Liquids:  regular thin  MEDICATIONS:  · With liquid  COMPENSATORY STRATEGIES/MODIFICATIONS INCLUDING:  · None  OTHER RECOMMENDATIONS (including follow up treatment recommendations):   · none  RECOMMENDED DIET MODIFICATIONS DISCUSSED WITH:  · Nursing  · Patient  FREQUENCY/DURATION: swallow function is within normal limits. Recommend regular diet/thin liquids. No further speech therapy indicated at this time. RECOMMENDED REHABILITATION/EQUIPMENT: (at time of discharge pending progress): Due to the probability of continued deficits (see above) this patient will not likely need continued skilled speech therapy after discharge. SUBJECTIVE:   \"I don't know where that came from. I swallow just fine! \"  History of Present Injury/Illness: Ms. Giselle Moise  has a past medical history of Arthritis; GERD (gastroesophageal reflux disease); H/O detached retina repair; Macular hole of right eye; Nausea & vomiting; Obesity; Status post total left knee replacement (12/7/2015); and Unspecified adverse effect of anesthesia. She also has no past medical history of Aneurysm ([address] Arrhythmia; Asthma; Autoimmune disease ([address] Cancer ([address] Chronic kidney disease; Chronic obstructive pulmonary disease ([address] Chronic pain; Coagulation disorder ([address] Diabetes ([address] Difficult intubation; Heart failure ([address] Hypertension; Liver disease; Malignant hyperthermia due to anesthesia; Pseudocholinesterase deficiency; Psychiatric disorder; PUD (peptic ulcer disease); Seizures ([address] Sleep apnea; Stroke [facility]); Thromboembolus ([address] or Thyroid disease. .  She also  has a past surgical history that includes hx hysterectomy (1980's); hx tubal ligation (1980's); hx appendectomy (1980's); hx cataract removal (10/20/11); hx retinal detachment repair; hx retinal detachment repair; and hx retinal detachment repair. Present Symptoms: No dysphagia symptoms identified   Pain Intensity 1: 0  Pain Location 1: Chest  Current Medications:   No current facility-administered medications on file prior to encounter. No current outpatient prescriptions on file prior to encounter.      Current Dietary Status:  NPO pending speech eval      Social History/Home Situation:    Home Environment: Private residence  # Steps to Enter:  (ramp)  One/Two Story Residence: Two story  Living Alone: No  Support Systems: Child(val), Confucianism / leeann community, Friends \ neighbors, Spouse/Significant Other/Partner  Patient Expects to be Discharged to[de-identified] Private residence  Current DME Used/Available at Home: Raised toilet seat, Grab bars  OBJECTIVE:   Respiratory Status:  Nasal cannula  2 l/min  CXR Results:1. No CT evidence of pulmonary embolus. 2.  Patchy airspace disease in the upper lung fields, possible pneumonitis. 3.  Marked splenomegaly. Oral Motor Structure/Speech:  Oral-Motor Structure/Motor Speech  Labial: No impairment  Dentition: Natural, Intact  Oral Hygiene: Adequate  Lingual: No impairment    Cognitive and Communication Status:  Neurologic State: Alert  Orientation Level: Oriented X4  Cognition: Appropriate decision making; Follows commands  Perception: Appears intact  Perseveration: No perseveration noted  Safety/Judgement: Awareness of environment    BEDSIDE SWALLOW EVALUATION  Oral Assessment:  Oral Assessment  Labial: No impairment  Dentition: Natural;Intact  Oral Hygiene: Adequate  Lingual: No impairment  P.O. Trials:  Patient Position: Upright in bed    The patient was given tsp-small bite amounts of the following:   Consistency Presented: Solid; Thin liquid;Mixed consistency;Puree  How Presented: Self-fed/presented;Cup/sip;Spoon;Straw;Successive swallows    ORAL PHASE:  Bolus Acceptance: No impairment  Bolus Formation/Control: No impairment  Propulsion: No impairment     Oral Residue: None    PHARYNGEAL PHASE:  Initiation of Swallow: No impairment  Laryngeal Elevation: Functional  Aspiration Signs/Symptoms: None  Vocal Quality: No impairment  Cues for Modifications: None  Effective Modifications: None     Pharyngeal Phase Characteristics: No impairment, issues, or problems     OTHER OBSERVATIONS:  Rate/bite size: WNL   Endurance: WNL   Comments:       Tool Used: Dysphagia Outcome and Severity Scale (CANDY) Score Comments   Normal Diet  [x] 7 With no strategies or extra time needed   Functional Swallow  [] 6 May have mild oral or pharyngeal delay       Mild Dysphagia    [] 5 Which may require one diet consistency restricted (those who demonstrate penetration which is entirely cleared on MBS would be included)   Mild-Moderate Dysphagia  [] 4 With 1-2 diet consistencies restricted       Moderate Dysphagia  [] 3 With 2 or more diet consistencies restricted       Moderately Severe Dysphagia  [] 2 With partial PO strategies (trials with ST only)       Severe Dysphagia  [] 1 With inability to tolerate any PO safely          Score:  Initial: 7 Most Recent: X (Date: -- )   Interpretation of Tool: The Dysphagia Outcome and Severity Scale (CANDY) is a simple, easy-to-use, 7-point scale developed to systematically rate the functional severity of dysphagia based on objective assessment and make recommendations for diet level, independence level, and type of nutrition. Score 7 6 5 4 3 2 1   Modifier CH CI CJ CK CL CM CN   ?  Swallowing:     - CURRENT STATUS: CH - 0% impaired, limited or restricted    - GOAL STATUS:  CH - 0% impaired, limited or restricted    - D/C STATUS:  CH - 0% impaired, limited or restricted  Payor: Rosio Yap / Plan: Argenis Rooney PPO/PFFS / Product Type: [employer] /     TREATMENT:    (In addition to Assessment/Re-Assessment sessions the following treatments were rendered)  Assessment/Reassessment only, no treatment provided today  MODALITIES:                                                                    ORAL MOTOR  EXERCISES:                                                                                                                                                                      LARYNGEAL / PHARYNGEAL EXERCISES: __________________________________________________________________________________________________  Safety:   After treatment position/precautions:  · Upright in Bed  Recommendations/Intent for next treatment session: Swallow function is within normal limits. Recommend regular diet/thin liquids. No further speech therapy indicated at this time.    Total Treatment Duration:  Time In: 1406  Time Out: 1415    TEMI Brooks, CCC-SLP, CBIS

## 2018-03-20 NOTE — PROGRESS NOTES
Bedside, verbal, and written report received from [facility]. Patient sitting up in bed talking on phone, alert and oriented x 4, no complaints of pain. Patient able to ambulate to restroom without assistance. VSS. 2L nasal cannula.  Will continue to monitor

## 2018-03-20 NOTE — IP AVS SNAPSHOT
[address] 
[phone] Patient: Lev Rivas MRN: [id number] LSW:[id number] A check saran indicates which time of day the medication should be taken. My Medications START taking these medications Instructions Each Dose to Equal  
 Morning Noon Evening Bedtime  
 aspirin 81 mg chewable tablet Your last dose was: Your next dose is: Take 1 Tab by mouth daily. 81 mg  
    
   
   
   
  
 azithromycin 250 mg tablet Commonly known as:  Afsaneh Flatness Your last dose was: Your next dose is: Take 2 Tabs by mouth daily for 3 days. 500 mg  
    
   
   
   
  
 cefdinir 300 mg capsule Commonly known as:  OMNICEF Your last dose was: Your next dose is: Take 1 Cap by mouth two (2) times a day for 5 days. 300 mg  
    
   
   
   
  
 guaiFENesin 1,200 mg Ta12 ER tablet Commonly known as:  Eric & Eric Your last dose was: Your next dose is: Take 1 Tab by mouth every twelve (12) hours for 5 days. 1200 mg  
    
   
   
   
  
 raNITIdine 150 mg tablet Commonly known as:  ZANTAC Your last dose was: Your next dose is: Take 1 Tab by mouth two (2) times a day. 150 mg Where to Get Your Medications Information on where to get these meds will be given to you by the nurse or doctor. ! Ask your nurse or doctor about these medications  
  aspirin 81 mg chewable tablet  
 azithromycin 250 mg tablet  
 cefdinir 300 mg capsule  
 guaiFENesin 1,200 mg Ta12 ER tablet  
 raNITIdine 150 mg tablet

## 2018-03-20 NOTE — PROGRESS NOTES
TRANSFER - IN REPORT:    Verbal report received from Jose E Ye RN (name) on Jessica Prado  being received from ED (unit) for routine progression of care      Report consisted of patients Situation, Background, Assessment and   Recommendations(SBAR). Information from the following report(s) SBAR, ED Summary, Intake/Output, MAR, Accordion, Recent Results, Med Rec Status and Cardiac Rhythm NSR was reviewed with the receiving nurse. Opportunity for questions and clarification was provided. Assessment completed upon patients arrival to unit and care assumed. Pt ambulated with minimal assist to bathroom then to bed. Placed on monitor and 2L NC. VSS, O2at 97% and denies SoB or pain.

## 2018-03-21 LAB
ALBUMIN SERPL-MCNC: 3.4 G/DL (ref 3.2–4.6)
ALBUMIN/GLOB SERPL: 1.2 {RATIO} (ref 1.2–3.5)
ALP SERPL-CCNC: 75 U/L (ref 50–136)
ALT SERPL-CCNC: 29 U/L (ref 12–65)
ANION GAP SERPL CALC-SCNC: 10 MMOL/L (ref 7–16)
AST SERPL-CCNC: 34 U/L (ref 15–37)
BASOPHILS # BLD: 0 K/UL (ref 0–0.2)
BASOPHILS NFR BLD: 1 % (ref 0–2)
BILIRUB SERPL-MCNC: 0.7 MG/DL (ref 0.2–1.1)
BUN SERPL-MCNC: 16 MG/DL (ref 8–23)
CALCIUM SERPL-MCNC: 9 MG/DL (ref 8.3–10.4)
CHLORIDE SERPL-SCNC: 104 MMOL/L (ref 98–107)
CMV IGG SERPL IA-ACNC: <0.6 U/ML (ref 0–0.59)
CMV IGM SERPL IA-ACNC: <30 AU/ML (ref 0–29.9)
CO2 SERPL-SCNC: 26 MMOL/L (ref 21–32)
CREAT SERPL-MCNC: 0.67 MG/DL (ref 0.6–1)
DIFFERENTIAL METHOD BLD: ABNORMAL
EOSINOPHIL # BLD: 0.2 K/UL (ref 0–0.8)
EOSINOPHIL NFR BLD: 6 % (ref 0.5–7.8)
ERYTHROCYTE [DISTWIDTH] IN BLOOD BY AUTOMATED COUNT: 14.2 % (ref 11.9–14.6)
GLOBULIN SER CALC-MCNC: 2.8 G/DL (ref 2.3–3.5)
GLUCOSE SERPL-MCNC: 139 MG/DL (ref 65–100)
HAV IGM SERPL QL IA: NEGATIVE
HBV CORE IGM SERPL QL IA: NEGATIVE
HBV SURFACE AG SERPL QL IA: NEGATIVE
HCT VFR BLD AUTO: 37.5 % (ref 35.8–46.3)
HCV AB S/CO SERPL IA: <0.1 S/CO RATIO (ref 0–0.9)
HGB BLD-MCNC: 11.9 G/DL (ref 11.7–15.4)
IMM GRANULOCYTES # BLD: 0 K/UL (ref 0–0.5)
IMM GRANULOCYTES NFR BLD AUTO: 1 % (ref 0–5)
LDH SERPL L TO P-CCNC: 248 U/L (ref 110–210)
LYMPHOCYTES # BLD: 0.7 K/UL (ref 0.5–4.6)
LYMPHOCYTES NFR BLD: 17 % (ref 13–44)
MCH RBC QN AUTO: 26.3 PG (ref 26.1–32.9)
MCHC RBC AUTO-ENTMCNC: 31.7 G/DL (ref 31.4–35)
MCV RBC AUTO: 82.8 FL (ref 79.6–97.8)
MM INDURATION POC: 0 MM (ref 0–5)
MONOCYTES # BLD: 0.3 K/UL (ref 0.1–1.3)
MONOCYTES NFR BLD: 8 % (ref 4–12)
NEUTS SEG # BLD: 2.6 K/UL (ref 1.7–8.2)
NEUTS SEG NFR BLD: 67 % (ref 43–78)
PLATELET # BLD AUTO: 120 K/UL (ref 150–450)
PMV BLD AUTO: 10.7 FL (ref 10.8–14.1)
POTASSIUM SERPL-SCNC: 4.1 MMOL/L (ref 3.5–5.1)
PPD POC: NORMAL NEGATIVE
PROT SERPL-MCNC: 6.2 G/DL (ref 6.3–8.2)
RBC # BLD AUTO: 4.53 M/UL (ref 4.05–5.25)
SODIUM SERPL-SCNC: 140 MMOL/L (ref 136–145)
WBC # BLD AUTO: 3.8 K/UL (ref 4.3–11.1)

## 2018-03-21 PROCEDURE — 74011250636 HC RX REV CODE- 250/636: Performed by: HOSPITALIST

## 2018-03-21 PROCEDURE — 36415 COLL VENOUS BLD VENIPUNCTURE: CPT | Performed by: HOSPITALIST

## 2018-03-21 PROCEDURE — 85025 COMPLETE CBC W/AUTO DIFF WBC: CPT | Performed by: HOSPITALIST

## 2018-03-21 PROCEDURE — 96372 THER/PROPH/DIAG INJ SC/IM: CPT

## 2018-03-21 PROCEDURE — 77010033678 HC OXYGEN DAILY

## 2018-03-21 PROCEDURE — 74011000258 HC RX REV CODE- 258: Performed by: HOSPITALIST

## 2018-03-21 PROCEDURE — 74011250637 HC RX REV CODE- 250/637: Performed by: HOSPITALIST

## 2018-03-21 PROCEDURE — 96366 THER/PROPH/DIAG IV INF ADDON: CPT

## 2018-03-21 PROCEDURE — 80053 COMPREHEN METABOLIC PANEL: CPT | Performed by: HOSPITALIST

## 2018-03-21 PROCEDURE — 94761 N-INVAS EAR/PLS OXIMETRY MLT: CPT

## 2018-03-21 PROCEDURE — 99218 HC RM OBSERVATION: CPT

## 2018-03-21 PROCEDURE — 83615 LACTATE (LD) (LDH) ENZYME: CPT | Performed by: NURSE PRACTITIONER

## 2018-03-21 RX ORDER — AZITHROMYCIN 250 MG/1
500 TABLET, FILM COATED ORAL DAILY
Status: DISCONTINUED | OUTPATIENT
Start: 2018-03-21 | End: 2018-03-23

## 2018-03-21 RX ADMIN — Medication 10 ML: at 21:34

## 2018-03-21 RX ADMIN — HEPARIN SODIUM 5000 UNITS: 5000 INJECTION, SOLUTION INTRAVENOUS; SUBCUTANEOUS at 13:42

## 2018-03-21 RX ADMIN — GUAIFENESIN 1200 MG: 600 TABLET, EXTENDED RELEASE ORAL at 21:27

## 2018-03-21 RX ADMIN — HEPARIN SODIUM 5000 UNITS: 5000 INJECTION, SOLUTION INTRAVENOUS; SUBCUTANEOUS at 05:32

## 2018-03-21 RX ADMIN — HEPARIN SODIUM 5000 UNITS: 5000 INJECTION, SOLUTION INTRAVENOUS; SUBCUTANEOUS at 21:27

## 2018-03-21 RX ADMIN — GUAIFENESIN 1200 MG: 600 TABLET, EXTENDED RELEASE ORAL at 08:32

## 2018-03-21 RX ADMIN — CEFTRIAXONE 1 G: 1 INJECTION, POWDER, FOR SOLUTION INTRAMUSCULAR; INTRAVENOUS at 11:54

## 2018-03-21 RX ADMIN — Medication 10 ML: at 05:32

## 2018-03-21 RX ADMIN — AZITHROMYCIN 500 MG: 250 TABLET, FILM COATED ORAL at 11:55

## 2018-03-21 NOTE — PROGRESS NOTES
TRANSFER - IN REPORT:    Verbal report received from Brodie Dalton [facility] (name) on [place] Vealrde  being received from ICU (unit) for routine progression of care      Report consisted of patients Situation, Background, Assessment and   Recommendations(SBAR). Information from the following report(s) SBAR, Kardex and Recent Results was reviewed with the receiving nurse. Opportunity for questions and clarification was provided. Assessment completed upon patients arrival to unit and care assumed.

## 2018-03-21 NOTE — PROGRESS NOTES
Oxygen Qualifier       Room air: SpO2 with O2 and liter flow   Resting SpO2 94% na   Ambulating SpO2 90% na     Pt denies increase WOB, SOB, and dizziness. No complications at this time. Completed by:     Apple De Jesus, RT

## 2018-03-21 NOTE — PROGRESS NOTES
Interdisciplinary team rounds were held 3/21/2018 with the following team members:Care Management, Nursing, Pastoral Care, Physical Therapy and Physician and the patient. Plan of care discussed. See clinical pathway and/or care plan for interventions and desired outcomes.

## 2018-03-21 NOTE — PROGRESS NOTES
Hospitalist Progress Note    3/21/2018  Admit Date: 3/20/2018  6:30 AM   NAME: Pasco Holter   :  1945   DOS:              18  MRN:  [id number]   Attending: Court Carl MD  PCP:  Aamir Sims MD  Treatment Team: Attending Provider: Court Carl MD    Full Code     SUBJECTIVE:   As previously documented: \" Ms. Priscila Mcmillan is a 68 yo F with PMHx of OA  And Obesity, although not on any medications at home who works at [employer] lab and presented to ED complaining of sudden right shoulder blade pain that lasted approx 15 min, 6/10 in intensity with radiation on her back and was associated with mild nausea. Complained of nonproductive cough for last month and some coughing spells with food intake. Complained of SOB and occasional mild LE edema, chest heaviness, 5/10, w/o radiation that lasted less than 5 min. Denies any spikes of fever. ED work-up showed WBC: 4.6, Plts:126, BNP:15, Trop I:0, D-dimer:1.10. CXR w/o acute cardiopulmonary disease. CTA chest w/o PE, patchy airspace disease in the upper lung fields, possible pneumonitis and marked splenomegaly. Had hypoxia 88% on Ra and  Was placed on O2 supplementation. Started on Rocephin/Azithromycin       18  Ms. Pasco Holter denies any SOB,CP or abdominal pain. On Ra. Afebrile. Serial Trop I >0. 04. Us abdomen with splenomegaly 24 cm. Mono screen, CMV, acute hepatitis panel and HIV negative . Mycoplasma pending. 10+ ROS reviewed and negative except for positive in HPI.    Allergies   Allergen Reactions    Augmentin [Amoxicillin-Pot Clavulanate] Nausea and Vomiting     Current Facility-Administered Medications   Medication Dose Route Frequency    cefTRIAXone (ROCEPHIN) 1 g in 0.9% sodium chloride (MBP/ADV) 50 mL  1 g IntraVENous Q24H    sodium chloride (NS) flush 5-10 mL  5-10 mL IntraVENous Q8H    sodium chloride (NS) flush 5-10 mL  5-10 mL IntraVENous PRN    tuberculin injection 5 Units  5 Units IntraDERMal ONCE    acetaminophen (TYLENOL) tablet 650 mg  650 mg Oral Q4H PRN    HYDROcodone-acetaminophen (NORCO) 5-325 mg per tablet 1 Tab  1 Tab Oral Q4H PRN    heparin (porcine) injection 5,000 Units  5,000 Units SubCUTAneous Q8H    ondansetron (ZOFRAN) injection 4 mg  4 mg IntraVENous Q4H PRN    guaiFENesin ER (MUCINEX) tablet 1,200 mg  1,200 mg Oral Q12H    azithromycin (ZITHROMAX) 500 mg in 0.9% sodium chloride (MBP/ADV) 250 mL  500 mg IntraVENous Q24H         Immunization History   Administered Date(s) Administered    Pneumococcal Polysaccharide (PPSV-23) 2015    TB Skin Test (PPD) Intradermal 2015, 2018     Objective:     Patient Vitals for the past 24 hrs:   Temp Pulse Resp BP SpO2   18 0748 - - - - 94 %   18 0745 - - - - 99 %   18 0710 97.7 °F (36.5 °C) 69 22 145/61 98 %   18 0532 - 65 17 142/58 97 %   18 0307 97.9 °F (36.6 °C) 81 22 137/53 94 %   18 0034 - 73 17 - 96 %   18 2345 97.8 °F (36.6 °C) 74 17 125/60 97 %   18 2243 - 71 16 - 98 %   18 1937 - 68 17 - 98 %   18 1934 97.6 °F (36.4 °C) 74 20 117/54 98 %   18 1622 97.9 °F (36.6 °C) 69 22 104/62 -   18 1621 - 72 (!) 35 104/62 98 %   18 1255 - - - - 98 %   18 1124 97.9 °F (36.6 °C) 71 28 133/71 97 %   18 1025 97.8 °F (36.6 °C) 81 23 126/59 97 %   18 1004 - 73 23 - (!) 89 %   18 1000 - 75 17 117/60 90 %   18 0955 - 73 17 - 90 %     Temp (24hrs), Av.8 °F (36.6 °C), Min:97.6 °F (36.4 °C), Max:97.9 °F (36.6 °C)    Oxygen Therapy  O2 Sat (%): 94 % (18)  Pulse via Oximetry: 68 beats per minute (18)  O2 Device: Room air (18)  O2 Flow Rate (L/min): 2 l/min (18)  Oxygen Therapy  O2 Sat (%): 94 % (18)  Pulse via Oximetry: 68 beats per minute (18)  O2 Device: Room air (18)  O2 Flow Rate (L/min): 2 l/min (1845)    Physical Exam:  General:         Alert, cooperative, no distress . Afebrile. Obese  HEENT:               NCAT. No obvious deformity. Nares normal. No drainage  Lungs:                  CTABL. No wheezing/rhonchi/rales RA  Cardiovascular:   RRR. No m/r/g. Trace pedal edema b/l. +2 PT/DT pulses b/l. Abdomen:       S/nt/nd. Bowel sounds normal. .   Skin:         No rashes or lesions. Not Jaundiced  Neurologic:    AAOx3. CN II- XII grossly WNL. No gross focal deficit. Moves all extremities. Normal sensory. Normal gait. Psychiatric:         Good mood. Normal affect. Denies any SI/HI.                DIAGNOSTIC STUDIES      Data Review:   Recent Results (from the past 24 hour(s))   POC TROPONIN-I    Collection Time: 03/20/18  9:40 AM   Result Value Ref Range    Troponin-I (POC) 0 (L) 0.02 - 0.05 ng/ml   CULTURE, BLOOD    Collection Time: 03/20/18 10:31 AM   Result Value Ref Range    Special Requests: RIGHT ANTECUBITAL      Culture result: NO GROWTH AFTER 21 HOURS     CULTURE, BLOOD    Collection Time: 03/20/18 10:47 AM   Result Value Ref Range    Special Requests: RIGHT  Antecubital        Culture result: NO GROWTH AFTER 21 HOURS     INFLUENZA A & B AG (RAPID TEST)    Collection Time: 03/20/18 10:47 AM   Result Value Ref Range    Influenza A Ag NEGATIVE  NEG      Influenza B Ag NEGATIVE  NEG     TROPONIN I    Collection Time: 03/20/18  3:54 PM   Result Value Ref Range    Troponin-I, Qt. <0.04 0.02 - 0.05 NG/ML   PROCALCITONIN    Collection Time: 03/20/18  3:54 PM   Result Value Ref Range    Procalcitonin <0.1 ng/mL   MONONUCLEOSIS SCREEN    Collection Time: 03/20/18  3:54 PM   Result Value Ref Range    Mononucleosis screen NEGATIVE  NEG     CMV AB, IGG/IGM    Collection Time: 03/20/18  3:54 PM   Result Value Ref Range    Cytomegalovirus Ab, IgG <0.60 0.00 - 0.59 U/mL    CMV Ab, IgM <30.0 0.0 - 29.9 AU/mL   HIV-1,2 P24 AG/AB SCREEN    Collection Time: 03/20/18  3:54 PM   Result Value Ref Range    p24 Antigen NONREACTIVE      HIV-1,2 Ab NONREACTIVE     HEPATITIS PANEL, ACUTE Collection Time: 03/20/18  3:54 PM   Result Value Ref Range    Hepatitis A Ab, IgM NEGATIVE  NEGATIVE      Hep B surface Ag screen NEGATIVE  NEGATIVE      Hep B Core Ab, IgM NEGATIVE  NEGATIVE      Hep C Virus Ab <0.1 0.0 - 0.9 s/co ratio   TROPONIN I    Collection Time: 03/20/18 10:11 PM   Result Value Ref Range    Troponin-I, Qt. <0.04 0.02 - 0.05 NG/ML   CBC WITH AUTOMATED DIFF    Collection Time: 03/21/18  3:58 AM   Result Value Ref Range    WBC 3.8 (L) 4.3 - 11.1 K/uL    RBC 4.53 4.05 - 5.25 M/uL    HGB 11.9 11.7 - 15.4 g/dL    HCT 37.5 35.8 - 46.3 %    MCV 82.8 79.6 - 97.8 FL    MCH 26.3 26.1 - 32.9 PG    MCHC 31.7 31.4 - 35.0 g/dL    RDW 14.2 11.9 - 14.6 %    PLATELET 713 (L) 559 - 450 K/uL    MPV 10.7 (L) 10.8 - 14.1 FL    DF AUTOMATED      NEUTROPHILS 67 43 - 78 %    LYMPHOCYTES 17 13 - 44 %    MONOCYTES 8 4.0 - 12.0 %    EOSINOPHILS 6 0.5 - 7.8 %    BASOPHILS 1 0.0 - 2.0 %    IMMATURE GRANULOCYTES 1 0.0 - 5.0 %    ABS. NEUTROPHILS 2.6 1.7 - 8.2 K/UL    ABS. LYMPHOCYTES 0.7 0.5 - 4.6 K/UL    ABS. MONOCYTES 0.3 0.1 - 1.3 K/UL    ABS. EOSINOPHILS 0.2 0.0 - 0.8 K/UL    ABS. BASOPHILS 0.0 0.0 - 0.2 K/UL    ABS. IMM. GRANS. 0.0 0.0 - 0.5 K/UL   METABOLIC PANEL, COMPREHENSIVE    Collection Time: 03/21/18  3:58 AM   Result Value Ref Range    Sodium 140 136 - 145 mmol/L    Potassium 4.1 3.5 - 5.1 mmol/L    Chloride 104 98 - 107 mmol/L    CO2 26 21 - 32 mmol/L    Anion gap 10 7 - 16 mmol/L    Glucose 139 (H) 65 - 100 mg/dL    BUN 16 8 - 23 MG/DL    Creatinine 0.67 0.6 - 1.0 MG/DL    GFR est AA >60 >60 ml/min/1.73m2    GFR est non-AA >60 >60 ml/min/1.73m2    Calcium 9.0 8.3 - 10.4 MG/DL    Bilirubin, total 0.7 0.2 - 1.1 MG/DL    ALT (SGPT) 29 12 - 65 U/L    AST (SGOT) 34 15 - 37 U/L    Alk.  phosphatase 75 50 - 136 U/L    Protein, total 6.2 (L) 6.3 - 8.2 g/dL    Albumin 3.4 3.2 - 4.6 g/dL    Globulin 2.8 2.3 - 3.5 g/dL    A-G Ratio 1.2 1.2 - 3.5         All Micro Results     Procedure Component Value Units Date/Time CULTURE, BLOOD [[id number]] Collected:  03/20/18 1031    Order Status:  Completed Specimen:  Whole Blood from Blood Updated:  03/21/18 0804     Special Requests: RIGHT ANTECUBITAL        Culture result: NO GROWTH AFTER 21 HOURS       CULTURE, BLOOD [[id number]] Collected:  03/20/18 1047    Order Status:  Completed Specimen:  Whole Blood from Blood Updated:  03/21/18 0804     Special Requests: --        RIGHT  Antecubital       Culture result: NO GROWTH AFTER 21 HOURS       MYCOPLASMA AB, IGG/IGM [[id number]] Collected:  03/20/18 1554    Order Status:  Completed Specimen:  Serum Updated:  03/20/18 1613    INFLUENZA A & B AG (RAPID TEST) [[id number]] Collected:  03/20/18 1047    Order Status:  Completed Specimen:  Nasopharyngeal from Nasal washing Updated:  03/20/18 1112     Influenza A Ag NEGATIVE          NEGATIVE FOR THE PRESENCE OF INFLUENZA A ANTIGEN  INFECTION DUE TO INFLUENZA A CANNOT BE RULED OUT. BECAUSE THE ANTIGEN PRESENT IN THE SAMPLE MAY BE BELOW  THE DETECTION LIMIT OF THE TEST. A NEGATIVE TEST IS PRESUMPTIVE AND IT IS RECOMMENDED THAT THESE RESULTS BE CONFIRMED BY VIRAL CULTURE OR AN FDA-CLEARED INFLUENZA A AND B MOLECULAR ASSAY. Influenza B Ag NEGATIVE          NEGATIVE FOR THE PRESENCE OF INFLUENZA B ANTIGEN  INFECTION DUE TO INFLUENZA B CANNOT BE RULED OUT. BECAUSE THE ANTIGEN PRESENT IN THE SAMPLE MAY BE BELOW  THE DETECTION LIMIT OF THE TEST. A NEGATIVE TEST IS PRESUMPTIVE AND IT IS RECOMMENDED THAT THESE RESULTS BE CONFIRMED BY VIRAL CULTURE OR AN FDA-CLEARED INFLUENZA A AND B MOLECULAR ASSAY. Imaging /Procedures /Studies:    CXR Results  (Last 48 hours)               03/20/18 0645  XR CHEST PORT Final result    Impression:  IMPRESSION: No acute cardiopulmonary disease. Narrative:  EXAM:  XR CHEST PORT       INDICATION:  cp       COMPARISON:  11/19/2015       FINDINGS: A portable AP radiograph of the chest was obtained at 0647 hours.    Calcified granuloma in the right lower lobe. The lungs are clear. The cardiac   and mediastinal contours and pulmonary vascularity are normal.  The bones and   soft tissues are grossly within normal limits. CT Results  (Last 48 hours)               03/20/18 0833  CT CHEST W CONT Final result    Impression:  IMPRESSION:     1. No CT evidence of pulmonary embolus. 2.  Patchy airspace disease in the upper lung fields, possible pneumonitis. 3.  Marked splenomegaly. Narrative:  Chest CT       INDICATION:  Chest pain and shortness of breath, elevated d-dimer       Multiple axial images were obtained through the chest during intravenous   infusion of 100mL of Isovue 370. Coronal reformats were also evaluated. Radiation dose reduction techniques were used for this study: All CT scans   performed at this facility use one or all of the following: Automated exposure   control, adjustment of the mA and/or kVp according to patient's size, iterative   reconstruction. FINDINGS: There is normal enhancement of the major pulmonary vessels. There is   no CT evidence of pulmonary embolus. There is also normal enhancement of the   aorta. There is no dissection or aneurysm. There is minimal patchy groundglass density in the upper lung fields. There are   no pulmonary masses. There are no effusions. There is no significant   mediastinal or axillary adenopathy. There are no bony lesions. There is   splenomegaly. The spleen measures at least 16 cm in length. Us Abd Comp  Result Date: 3/20/2018  IMPRESSION: Splenomegaly, 24 cm. Normal hepatopedal flow in the portal vein. No perisplenic or splenorenal varices appreciated. Liver is unremarkable. No ascites. Duplex Lower Ext Venous Bilat  Result Date: 3/20/2018  IMPRESSION: No deep vein thrombosis identified in either lower extremity.      Results for orders placed or performed during the hospital encounter of 03/20/18   2D ECHO COMPLETE ADULT (TTE) W OR WO CONTR    Narrative    [facility]  [address] [facility], [address]  [phone]    Transthoracic Echocardiogram  2D, M-mode, Doppler, and Color Doppler    Patient: Freddie Adams  MR #: [id number]  : 1945  Age: 67 years  Gender: Female  Study date: 20-Mar-2018  Account #: [de-identified]  Height: 63 in  Weight: 245.5 lb  BSA: 2.11 mï¾²  Status:Routine  Location: 375  BP: 133/ 71    Allergies: AMOXICILLIN-POT CLAVULANATE    Sonographer:  Luciano Noel [facility]  Group:  [facility]  Referring Physician:  Delmy Irvin MD  Reading Physician:  Satish Brooks. MD [facility]    INDICATIONS: Chest Pain; Edema; Shortness of breath; massive splenomegaly. PROCEDURE: This was a routine study. A transthoracic echocardiogram was  performed. The study included complete 2D imaging, M-mode, complete spectral  Doppler, and color Doppler. Image quality was adequate. LEFT VENTRICLE: Size was normal. Systolic function was normal. Ejection  fraction was estimated in the range of 55 % to 60 %. There were no regional  wall motion abnormalities. Wall thickness was normal.    RIGHT VENTRICLE: The size was normal. Systolic function was normal. The  tricuspid jet envelope definition was inadequate for estimation of RV   systolic  pressure. LEFT ATRIUM: The atrium was moderately dilated. RIGHT ATRIUM: The atrium was mildly dilated. SYSTEMIC VEINS: IVC: The inferior vena cava was not well visualized. The  inferior vena cava was normal in size. AORTIC VALVE: The valve was trileaflet. Leaflets exhibited mild sclerosis. There was no evidence for stenosis. There was no insufficiency. MITRAL VALVE: There was mild annular calcification. There was no evidence for  stenosis. There was mild regurgitation. TRICUSPID VALVE: The valve structure was normal. There was no evidence for  stenosis. There was trivial regurgitation.     PULMONIC VALVE: The valve structure was normal. There was no evidence for  stenosis. There was no insufficiency. PERICARDIUM: There was no pericardial effusion. AORTA: The root exhibited normal size. SUMMARY:    -  Left ventricle: Systolic function was normal. Ejection fraction was  estimated in the range of 55 % to 60 %. There were no regional wall motion  abnormalities. -  Left atrium: The atrium was moderately dilated. -  Right atrium: The atrium was mildly dilated. -  Mitral valve: There was mild annular calcification. There was mild  regurgitation. SYSTEM MEASUREMENT TABLES    2D mode  AoR Diam (2D): 2.6 cm  LA Dimension (2D): 4 cm  Left Atrium Systolic Volume Index; Method of Disks, Biplane; 2D mode;: 36   ml/m2  IVS/LVPW (2D): 0.8  IVSd (2D): 0.7 cm  LVIDd (2D): 4.4 cm  LVIDs (2D): 2.5 cm  LVOT Area (2D): 3.1 cm2  LVPWd (2D): 0.9 cm    Tissue Doppler Imaging  LV Peak Early Ortiz Tissue El; Lateral MA (TDI): 10.9 cm/s  LV Peak Early Ortiz Tissue El; Medial MA (TDI): 7.6 cm/s    Unspecified Scan Mode  Peak Grad; Mean; Antegrade Flow: 12 mm[Hg]  Vmax; Antegrade Flow: 174 cm/s  LVOT Diam: 2 cm  MV Peak El/LV Peak Tissue El E-Wave; Lateral MA: 7.7  MV Peak El/LV Peak Tissue El E-Wave; Medial MA: 11    Prepared and signed by    Laurel Hinton MD [facility]  Signed 20-Mar-2018 17:21:59         Labs and Studies from previous 24 hours have been personally reviewed by myself [address] [facility] Problems as of 3/21/2018  Date Reviewed: 11/19/2015          Codes Class Noted - Resolved POA    * (Principal)Acute respiratory failure ([facility] [address] ICD-10-CM: J96.00  ICD-9-CM: 518.81  3/20/2018 - Present Yes        Elevated troponin I level ICD-10-CM: R74.8  ICD-9-CM: 790.6  3/20/2018 - Present Yes        Chest pain ICD-10-CM: R07.9  ICD-9-CM: 786.50  3/20/2018 - Present Yes        CAP (community acquired pneumonia) ICD-10-CM: J18.9  ICD-9-CM: 966  3/20/2018 - Present Yes        Splenomegaly ICD-10-CM: R16.1  ICD-9-CM: 789.2  3/20/2018 - Present Yes        GERD (gastroesophageal reflux disease) ICD-10-CM: K21.9  ICD-9-CM: 530.81  Unknown - Present Yes    Overview Signed 11/19/2015  1:35 PM by MACKENZIE Padilla     diet controlled - very rare             Obesity, morbid ([facility] [address] ICD-10-CM: E66.01  ICD-9-CM: 278.01  12/26/2009 - Present Yes              Plan:  Acute respiratory failure:  - unclear etiology, although likely due to CAP  - BNP low, although with LE edema dn marked splenomegaly - TTE: EF:55-60%,   -on RA  - check walking oximetry.        CAP:  - on Rocephin/Azythromycin  - follow blood/sputum cultures  - speech evaluation to r/o aspiration due to hx of cough after eating        Chest pain:  - serial Trop I/ EKG  - initial Trop I/EKG. w/o acute pathology  - TTE: EF:55-60%, no regional wall motion abnormalities  - ASa 81 mg  - check lipid panel  - will need outpatient cardiology follow-up      Splenomegaly:  - massive   - unclear etiology  -  Mycoplasma panel, PPD   -  hepatitis panel, CMP, HIV negative  - US abdomen with enlarged spleen 24 cm  - blood smear  - consult hematology - appreciate the help        GERD:  - denies - monitor        Obesity:  -educated    DVT Prophylaxis: Heparin SQ  CODE Status: Full CODE  Plan of Care Discussed with: patient.  Care team.  Medical Risk: moderate  Disposition: home when medically stable    Bhumika Fiore MD  03/21/18

## 2018-03-21 NOTE — CONSULTS
[facility] Hematology & Oncology        Inpatient Hematology / Oncology Consult Note    Reason for Consult:  Acute respiratory failure ([facility] [address] [facility]  Acute respiratory failure [facility])  Referring Physician:  Denyce Kawasaki, MD    History of Present Illness:  Ms. Jae Yun is a 67 y.o. female admitted on 3/20/2018 . PMH obesity, OA, not currently on any medications. 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 Presented to ER with complaints of sudden onset shoulder pain, radiating to her back, nausea, some chest heaviness. Cardiac work up negative. Did note some swelling to lower extremities and shortness of breath. CT chest done and negative for PE but found patchy airspace disease in upper lung fields, possible pneumonitis. She was hypoxic with O2 sat 88% in the ER. Started on rocephin and azithromycin for CAP, now back on room air. CT scan of the chest was concerning for splenomegaly, no adenopathy noted . Abdominal ultrasound done and reports massive splenomegaly at 24cm. No issues with portal veins, liver or ascites. No prior scans available for comparison. HIV/hepatitis panels are negative. We were consulted for further recommendations regarding her enlarged spleen. She has a mild thrombocytopenia with plt count 120,000. WBC 3.8 with normal differential. Hgb 11.9. Reviewing care everywhere, appears she saw her PCP on  with complaints of nausea and loose stools. At that time she reported weight loss 11 lbs in 4 days. Per MD note \"her stomach cramps and she feels like she is about to pass out. \" She was started on the the DASH diet per PCP. Currently, she is feeling relatively well. She reported occasional nausea and early satiety. She denied B symptoms. fam Hx: mother  of renal cancer at 62   Social - former smoker, 1PPD x20yrs, quit in     Review of Systems:  Constitutional Denies fever, chills, weight loss, appetite changes, fatigue, night sweats.    HEENT Denies trauma, blurry vision, hearing loss, ear pain, nosebleeds, sore throat, neck pain    Skin Denies lesions or rashes. Lungs Denies dyspnea, cough, sputum production or hemoptysis. Cardiovascular Denies chest pain, palpitations, or lower extremity edema. Gastrointestinal Denies nausea, vomiting, changes in bowel habits, bloody or black stools, abdominal pain.  Denies dysuria, frequency or hesitancy of urination. Neuro Denies headaches, visual changes or ataxia. Denies dizziness, tingling, tremors, sensory change, speech change, focal weakness      Hematology Denies easy bruising or bleeding, denies gingival bleeding or epistaxis. Endo Denies heat/cold intolerance, denies diabetes or thyroid abnormalities. MSK + arthritis    Psychiatric/Behavioral Denies depression and substance abuse. The patient is not nervous/anxious.          Allergies   Allergen Reactions    Augmentin [Amoxicillin-Pot Clavulanate] Nausea and Vomiting     Past Medical History:   Diagnosis Date    Arthritis     OA- knees    GERD (gastroesophageal reflux disease)     diet controlled - very rare    H/O detached retina repair     right eye    Macular hole of right eye     Nausea & vomiting     after anesthesia-none with 2/13 eye surgery-given benadryl in preop for post nasal drip with success    Obesity     Status post total left knee replacement 12/7/2015    Unspecified adverse effect of anesthesia     takes longer to wake- pt states low tolerance for medications     Past Surgical History:   Procedure Laterality Date    HX APPENDECTOMY  1980's    with hysterectomy    HX CATARACT REMOVAL  10/20/11    bilateral with lens implant    HX HYSTERECTOMY  1980's    partial hysterectomy & appendectomy    HX RETINAL DETACHMENT REPAIR      right side    HX RETINAL DETACHMENT REPAIR      macular hole repair right side     HX RETINAL DETACHMENT REPAIR      left macular hole 2/6/13    HX TUBAL LIGATION  1980's     Family History   Problem Relation Age of Onset    Diabetes Mother      late onset type 2    Cancer Mother      kidney    Hypertension Father     Heart Disease Father      CABG    Cancer Brother      pancreatic, bowel     Social History     Social History    Marital status:      Spouse name: N/A    Number of children: N/A    Years of education: N/A     Occupational History    Not on file. Social History Main Topics    Smoking status: Former Smoker     Packs/day: 1.00     Years: 20.00     Quit date: 12/26/1984    Smokeless tobacco: Never Used    Alcohol use No    Drug use: No    Sexual activity: Not on file     Other Topics Concern    Not on file     Social History Narrative    , lives with . She works as  at [employer].       Current Facility-Administered Medications   Medication Dose Route Frequency Provider Last Rate Last Dose    azithromycin (ZITHROMAX) tablet 500 mg  500 mg Oral DAILY Romana Alexander, MD   500 mg at 03/21/18 1155    cefTRIAXone (ROCEPHIN) 1 g in 0.9% sodium chloride (MBP/ADV) 50 mL  1 g IntraVENous Q24H Romana Alexander,  mL/hr at 03/21/18 1154 1 g at 03/21/18 1154    sodium chloride (NS) flush 5-10 mL  5-10 mL IntraVENous Q8H Romana Alexander, MD   10 mL at 03/21/18 0532    sodium chloride (NS) flush 5-10 mL  5-10 mL IntraVENous PRN Romana Alexander, MD        acetaminophen (TYLENOL) tablet 650 mg  650 mg Oral Q4H PRN Romana Alexander, MD        HYDROcodone-acetaminophen (NORCO) 5-325 mg per tablet 1 Tab  1 Tab Oral Q4H PRN Romana Alexander, MD        heparin (porcine) injection 5,000 Units  5,000 Units SubCUTAneous Q8H Romana Alexander, MD   5,000 Units at 03/21/18 0532    ondansetron (ZOFRAN) injection 4 mg  4 mg IntraVENous Q4H PRN Romana Alexander, MD        guaiFENesin ER [facility]) tablet 1,200 mg  1,200 mg Oral Q12H Romana Alexander, MD   1,200 mg at 03/21/18 9100       OBJECTIVE:  Patient Vitals for the past 8 hrs:   BP Temp Pulse Resp SpO2   03/21/18 1057 133/64 96 °F (35.6 °C) 75 20 93 %   03/21/18 0930 129/56 - 64 24 92 %   18 0900 - - 68 18 92 %   18 0800 - - 73 17 92 %   18 0748 - - - - 94 %   18 0745 - - - - 99 %   18 0730 - - 60 14 97 %   18 0712 145/61 - 75 23 98 %   18 0710 145/61 97.7 °F (36.5 °C) 69 22 98 %     Temp (24hrs), Av.5 °F (36.4 °C), Min:96 °F (35.6 °C), Max:97.9 °F (36.6 °C)     07 -  1900  In: 240 [P.O.:240]  Out: 300 [Urine:300]    Physical Exam:  Constitutional: Well developed, obese female in no acute distress, sitting comfortably in the hospital bed. HEENT: Normocephalic and atraumatic. Oropharynx is clear, mucous membranes are moist.  Neck supple    Lymph node   No palpable submandibular, cervical, supraclavicular, axillary or inguinal lymph nodes. Skin Warm and dry. No bruising and no rash noted. No erythema. No pallor. Respiratory Lungs are clear to auscultation bilaterally without wheezes, rales or rhonchi, normal air exchange without accessory muscle use. CVS Normal rate, regular rhythm and normal S1 and S2. No murmurs   Abdomen Soft, nontender and nondistended, normoactive bowel sounds. No palpable mass. + splenomegaly    Neuro Grossly nonfocal with no obvious sensory or motor deficits. MSK Normal range of motion in general.  No edema and no tenderness. Psych Appropriate mood and affect.         Labs:    Recent Results (from the past 24 hour(s))   TROPONIN I    Collection Time: 18  3:54 PM   Result Value Ref Range    Troponin-I, Qt. <0.04 0.02 - 0.05 NG/ML   PROCALCITONIN    Collection Time: 18  3:54 PM   Result Value Ref Range    Procalcitonin <0.1 ng/mL   MONONUCLEOSIS SCREEN    Collection Time: 18  3:54 PM   Result Value Ref Range    Mononucleosis screen NEGATIVE  NEG     CMV AB, IGG/IGM    Collection Time: 18  3:54 PM   Result Value Ref Range    Cytomegalovirus Ab, IgG <0.60 0.00 - 0.59 U/mL    CMV Ab, IgM <30.0 0.0 - 29.9 AU/mL   HIV-1,2 P24 AG/AB SCREEN    Collection Time: 03/20/18  3:54 PM   Result Value Ref Range    p24 Antigen NONREACTIVE      HIV-1,2 Ab NONREACTIVE     HEPATITIS PANEL, ACUTE    Collection Time: 03/20/18  3:54 PM   Result Value Ref Range    Hepatitis A Ab, IgM NEGATIVE  NEGATIVE      Hep B surface Ag screen NEGATIVE  NEGATIVE      Hep B Core Ab, IgM NEGATIVE  NEGATIVE      Hep C Virus Ab <0.1 0.0 - 0.9 s/co ratio   TROPONIN I    Collection Time: 03/20/18 10:11 PM   Result Value Ref Range    Troponin-I, Qt. <0.04 0.02 - 0.05 NG/ML   CBC WITH AUTOMATED DIFF    Collection Time: 03/21/18  3:58 AM   Result Value Ref Range    WBC 3.8 (L) 4.3 - 11.1 K/uL    RBC 4.53 4.05 - 5.25 M/uL    HGB 11.9 11.7 - 15.4 g/dL    HCT 37.5 35.8 - 46.3 %    MCV 82.8 79.6 - 97.8 FL    MCH 26.3 26.1 - 32.9 PG    MCHC 31.7 31.4 - 35.0 g/dL    RDW 14.2 11.9 - 14.6 %    PLATELET 648 (L) 377 - 450 K/uL    MPV 10.7 (L) 10.8 - 14.1 FL    DF AUTOMATED      NEUTROPHILS 67 43 - 78 %    LYMPHOCYTES 17 13 - 44 %    MONOCYTES 8 4.0 - 12.0 %    EOSINOPHILS 6 0.5 - 7.8 %    BASOPHILS 1 0.0 - 2.0 %    IMMATURE GRANULOCYTES 1 0.0 - 5.0 %    ABS. NEUTROPHILS 2.6 1.7 - 8.2 K/UL    ABS. LYMPHOCYTES 0.7 0.5 - 4.6 K/UL    ABS. MONOCYTES 0.3 0.1 - 1.3 K/UL    ABS. EOSINOPHILS 0.2 0.0 - 0.8 K/UL    ABS. BASOPHILS 0.0 0.0 - 0.2 K/UL    ABS. IMM. GRANS. 0.0 0.0 - 0.5 K/UL   METABOLIC PANEL, COMPREHENSIVE    Collection Time: 03/21/18  3:58 AM   Result Value Ref Range    Sodium 140 136 - 145 mmol/L    Potassium 4.1 3.5 - 5.1 mmol/L    Chloride 104 98 - 107 mmol/L    CO2 26 21 - 32 mmol/L    Anion gap 10 7 - 16 mmol/L    Glucose 139 (H) 65 - 100 mg/dL    BUN 16 8 - 23 MG/DL    Creatinine 0.67 0.6 - 1.0 MG/DL    GFR est AA >60 >60 ml/min/1.73m2    GFR est non-AA >60 >60 ml/min/1.73m2    Calcium 9.0 8.3 - 10.4 MG/DL    Bilirubin, total 0.7 0.2 - 1.1 MG/DL    ALT (SGPT) 29 12 - 65 U/L    AST (SGOT) 34 15 - 37 U/L    Alk.  phosphatase 75 50 - 136 U/L    Protein, total 6.2 (L) 6.3 - 8.2 g/dL    Albumin 3.4 3.2 - 4.6 g/dL Globulin 2.8 2.3 - 3.5 g/dL    A-G Ratio 1.2 1.2 - 3.5     PLEASE READ & DOCUMENT PPD TEST IN 24 HRS    Collection Time: 03/21/18 12:07 PM   Result Value Ref Range    PPD  Negative    mm Induration 0 0 mm       Imaging:  US ABD COMP [[id number]] Collected: 03/20/18 1633      Order Status: Completed Updated: 03/20/18 1638     Narrative:       ABDOMINAL ULTRASOUND. HISTORY: Splenomegaly. COMPARISON: None. FINDINGS: Pancreas unremarkable. . Aorta is normal caliber, 1.8 cm. Proximal to  distal portion is obscured by bowel gas. The IVC is patent. The enlarged spleen  has a homogenous echotexture and measures 23.5 x 8.5 cm.  Left kidney is  unremarkable without hydronephrosis and measures 12.3 cm. Right kidney is  unremarkable without hydronephrosis and measures 12.2 cm. There is renal  cortical thinning. Renal echogenicity is normal, the right kidney is hypoechoic  to the liver. The intrahepatic biliary tree is not dilated. There is hepatopetal  flow in the portal vein. No gross focal parenchymal abnormality identified  within the liver. The gallbladder wall is not thickened. No gallstones. The  common bile duct is not dilated 5 mm. No ascites.       Impression:       IMPRESSION: Splenomegaly, 24 cm. Normal hepatopedal flow in the portal vein. No  perisplenic or splenorenal varices appreciated. Liver is unremarkable. No  ascites.       DUPLEX LOWER EXT VENOUS BILAT [[id number]] Collected: 03/20/18 1336     Order Status: Completed Updated: 03/20/18 1341     Narrative:       TITLE: Lower extremity venous ultrasound examination. INDICATION: Bilateral lower extremity swelling, pain.  Deep vein thrombosis  suspected. TECHNIQUE: Grayscale, Color, and Spectral Doppler interrogation performed. COMPARISON: Ultrasound 6/30/2016.     FINDINGS:   The right common femoral, femoral, and popliteal veins all demonstrate normal  compressibility, spontaneous flow, and augmentation.  The right posterior tibial  and peroneal veins demonstrate normal compression and flow with color  interrogation. The left common femoral, femoral, and popliteal veins all demonstrate normal  compressibility, spontaneous flow, and augmentation.  The left posterior tibial  and peroneal veins demonstrate normal compression and flow with color  interrogation.       Impression:       IMPRESSION: No deep vein thrombosis identified in either lower extremity.           CT CHEST W CONT [[id number]] Collected: 03/20/18 0852     Order Status: Completed Updated: 03/20/18 0857     Narrative:       Chest CT    INDICATION:  Chest pain and shortness of breath, elevated d-dimer    Multiple axial images were obtained through the chest during intravenous  infusion of 100mL of Isovue 370.  Coronal reformats were also evaluated. Radiation dose reduction techniques were used for this study:  All CT scans  performed at this facility use one or all of the following: Automated exposure  control, adjustment of the mA and/or kVp according to patient's size, iterative  reconstruction. FINDINGS: There is normal enhancement of the major pulmonary vessels. There is  no CT evidence of pulmonary embolus. There is also normal enhancement of the  aorta. There is no dissection or aneurysm. There is minimal patchy groundglass density in the upper lung fields.  There are  no pulmonary masses. Gladystine Or are no effusions. There is no significant  mediastinal or axillary adenopathy. There are no bony lesions.  There is  splenomegaly.  The spleen measures at least 16 cm in length.       Impression:       IMPRESSION:    1.  No CT evidence of pulmonary embolus. 2.  Patchy airspace disease in the upper lung fields, possible pneumonitis.   3.  Marked splenomegaly.         XR CHEST PORT [[id number]] Collected: 03/20/18 0648     Order Status: Completed Updated: 03/20/18 0651     Narrative:       EXAM:  XR CHEST PORT    INDICATION:  cp    COMPARISON:  11/19/2015    FINDINGS: A portable AP radiograph of the chest was obtained at 0647 hours. Calcified granuloma in the right lower lobe.  The lungs are clear.  The cardiac  and mediastinal contours and pulmonary vascularity are normal.  The bones and  soft tissues are grossly within normal limits.        Impression:       IMPRESSION: No acute cardiopulmonary disease. ASSESSMENT:  Problem List  Date Reviewed: 11/19/2015          Codes Class Noted    * (Principal)Acute respiratory failure ([facility] ICD-10-CM: J96.00  ICD-9-CM: 518.81  3/20/2018        Elevated troponin I level ICD-10-CM: R74.8  ICD-9-CM: 790.6  3/20/2018        Chest pain ICD-10-CM: R07.9  ICD-9-CM: 786.50  3/20/2018        CAP (community acquired pneumonia) ICD-10-CM: J18.9  ICD-9-CM: 486  3/20/2018        Splenomegaly ICD-10-CM: R16.1  ICD-9-CM: 789.2  3/20/2018        Status post total left knee replacement ICD-10-CM: P40.068  ICD-9-CM: V43.65  12/7/2015        Arthritis ICD-10-CM: M19.90  ICD-9-CM: 716.90  Unknown    Overview Signed 11/19/2015  1:35 PM by MACKENZIE Khan     OA- knees             GERD (gastroesophageal reflux disease) ICD-10-CM: K21.9  ICD-9-CM: 530.81  Unknown    Overview Signed 11/19/2015  1:35 PM by MACKENZIE Khan     diet controlled - very rare             Obesity, morbid ([facility] ICD-10-CM: E66.01  ICD-9-CM: 278.01  12/26/2009                RECOMMENDATIONS:  Ms Gualberto Zelaya is very pleasant 65yo woman with PMHx of OA, obesity, GERD with incidentally found splenomegaly. Her cbc is with normal differential and mild thrombocytopenia. The most common causes of splenomegaly include liver disease, heme malignancy and infections. Peripheral smear is pending. Will check SPEP/UPEP/FLC, flow cytometry, BCR/ABL, Jak2, RF/HILARY, LDH and haptoglobin. Infectious workup negative thus far. If testing above negative, may consider abdominal fat pad, bone marrow bx to rule out amyloid or MDS/MPN. Will get PET/CT as outpt. She will follow up with me upon discharge. Lab studies and imaging studies (CT chest, abdominal ultrasound) were personally reviewed. Thank you for allowing us to participate in the care of Ms. Dhillon.        MD Tricia Frye  Hematology & Oncology  [address] [place] [address]  Office : [phone]  Fax : [phone]

## 2018-03-21 NOTE — PROGRESS NOTES
TRANSFER - OUT REPORT:    Verbal report given to Reagan Sacks RN on Lev Rivas  being transferred to [address] [place] [address] for routine progression of care       Report consisted of patients Situation, Background, Assessment and   Recommendations(SBAR). Information from the following report(s) SBAR, Kardex, Intake/Output, MAR, Recent Results and Med Rec Status was reviewed with the receiving nurse. Lines:   Peripheral IV 03/20/18 Right Antecubital (Active)   Site Assessment Clean;Dry 3/21/2018  7:48 AM   Phlebitis Assessment 0 3/21/2018  7:48 AM   Infiltration Assessment 0 3/21/2018  7:48 AM   Dressing Status Clean, dry, & intact 3/21/2018  7:48 AM   Dressing Type Tape;Transparent 3/21/2018  7:48 AM   Hub Color/Line Status Capped 3/21/2018  7:48 AM   Alcohol Cap Used No 3/20/2018  7:34 PM        Opportunity for questions and clarification was provided.       Patient transported with:   [facility]

## 2018-03-21 NOTE — PROGRESS NOTES
Pt arrived from ICU. Pt alert/oriented x4. Respirations even and unlabored, lung sounds clear bilaterally on room air. Pt reports some SANCHEZ. No chest pain or SOB now. Abdomen soft/nontender. Pt voiding w/o difficulty. Pt denies any needs. All safety measures in place. Encouraged to call if needs arise. Pt is going to get in the shower now.

## 2018-03-21 NOTE — PROGRESS NOTES
SW asked to inform pt that she has been changed to OBS status. Pt given copy of letter and pt signed copy which was placed on chart.  Ed Hicks

## 2018-03-22 LAB
ANION GAP SERPL CALC-SCNC: 8 MMOL/L (ref 7–16)
BUN SERPL-MCNC: 16 MG/DL (ref 8–23)
CALCIUM SERPL-MCNC: 9 MG/DL (ref 8.3–10.4)
CHLORIDE SERPL-SCNC: 105 MMOL/L (ref 98–107)
CHOLEST SERPL-MCNC: 119 MG/DL
CO2 SERPL-SCNC: 27 MMOL/L (ref 21–32)
CREAT SERPL-MCNC: 0.69 MG/DL (ref 0.6–1)
ERYTHROCYTE [DISTWIDTH] IN BLOOD BY AUTOMATED COUNT: 13.9 % (ref 11.9–14.6)
GLUCOSE SERPL-MCNC: 130 MG/DL (ref 65–100)
HAPTOGLOB SERPL-MCNC: 25 MG/DL (ref 30–200)
HCT VFR BLD AUTO: 36.8 % (ref 35.8–46.3)
HDLC SERPL-MCNC: 37 MG/DL (ref 40–60)
HDLC SERPL: 3.2 {RATIO}
HGB BLD-MCNC: 11.7 G/DL (ref 11.7–15.4)
LDH SERPL L TO P-CCNC: 247 U/L (ref 110–210)
LDLC SERPL CALC-MCNC: 62 MG/DL
LIPID PROFILE,FLP: ABNORMAL
M PNEUMO IGG SER IA-ACNC: 156 U/ML (ref 0–99)
M PNEUMO IGM SER IA-ACNC: <770 U/ML (ref 0–769)
MCH RBC QN AUTO: 26 PG (ref 26.1–32.9)
MCHC RBC AUTO-ENTMCNC: 31.8 G/DL (ref 31.4–35)
MCV RBC AUTO: 81.8 FL (ref 79.6–97.8)
PATH REV BLD -IMP: NORMAL
PLATELET # BLD AUTO: 145 K/UL (ref 150–450)
PMV BLD AUTO: 10.5 FL (ref 10.8–14.1)
POTASSIUM SERPL-SCNC: 4.3 MMOL/L (ref 3.5–5.1)
RBC # BLD AUTO: 4.5 M/UL (ref 4.05–5.25)
SODIUM SERPL-SCNC: 140 MMOL/L (ref 136–145)
TRIGL SERPL-MCNC: 100 MG/DL (ref 35–150)
VLDLC SERPL CALC-MCNC: 20 MG/DL (ref 6–23)
WBC # BLD AUTO: 3.8 K/UL (ref 4.3–11.1)

## 2018-03-22 PROCEDURE — 80061 LIPID PANEL: CPT | Performed by: HOSPITALIST

## 2018-03-22 PROCEDURE — 96372 THER/PROPH/DIAG INJ SC/IM: CPT

## 2018-03-22 PROCEDURE — 74011250636 HC RX REV CODE- 250/636: Performed by: HOSPITALIST

## 2018-03-22 PROCEDURE — 83883 ASSAY NEPHELOMETRY NOT SPEC: CPT | Performed by: NURSE PRACTITIONER

## 2018-03-22 PROCEDURE — 74011000258 HC RX REV CODE- 258: Performed by: HOSPITALIST

## 2018-03-22 PROCEDURE — 74011250637 HC RX REV CODE- 250/637: Performed by: HOSPITALIST

## 2018-03-22 PROCEDURE — 86334 IMMUNOFIX E-PHORESIS SERUM: CPT | Performed by: NURSE PRACTITIONER

## 2018-03-22 PROCEDURE — 85027 COMPLETE CBC AUTOMATED: CPT | Performed by: HOSPITALIST

## 2018-03-22 PROCEDURE — 84165 PROTEIN E-PHORESIS SERUM: CPT | Performed by: NURSE PRACTITIONER

## 2018-03-22 PROCEDURE — 36415 COLL VENOUS BLD VENIPUNCTURE: CPT | Performed by: HOSPITALIST

## 2018-03-22 PROCEDURE — 86038 ANTINUCLEAR ANTIBODIES: CPT | Performed by: NURSE PRACTITIONER

## 2018-03-22 PROCEDURE — 96376 TX/PRO/DX INJ SAME DRUG ADON: CPT

## 2018-03-22 PROCEDURE — 99218 HC RM OBSERVATION: CPT

## 2018-03-22 PROCEDURE — 80048 BASIC METABOLIC PNL TOTAL CA: CPT | Performed by: HOSPITALIST

## 2018-03-22 PROCEDURE — 83010 ASSAY OF HAPTOGLOBIN QUANT: CPT | Performed by: NURSE PRACTITIONER

## 2018-03-22 PROCEDURE — 99235 HOSP IP/OBS SAME DATE MOD 70: CPT | Performed by: INTERNAL MEDICINE

## 2018-03-22 PROCEDURE — 81206 BCR/ABL1 GENE MAJOR BP: CPT | Performed by: NURSE PRACTITIONER

## 2018-03-22 PROCEDURE — 96366 THER/PROPH/DIAG IV INF ADDON: CPT

## 2018-03-22 PROCEDURE — 86430 RHEUMATOID FACTOR TEST QUAL: CPT | Performed by: NURSE PRACTITIONER

## 2018-03-22 PROCEDURE — 81270 JAK2 GENE: CPT

## 2018-03-22 PROCEDURE — 86431 RHEUMATOID FACTOR QUANT: CPT | Performed by: NURSE PRACTITIONER

## 2018-03-22 PROCEDURE — 83615 LACTATE (LD) (LDH) ENZYME: CPT | Performed by: NURSE PRACTITIONER

## 2018-03-22 RX ADMIN — AZITHROMYCIN 500 MG: 250 TABLET, FILM COATED ORAL at 08:22

## 2018-03-22 RX ADMIN — Medication 10 ML: at 22:00

## 2018-03-22 RX ADMIN — Medication 10 ML: at 14:00

## 2018-03-22 RX ADMIN — HEPARIN SODIUM 5000 UNITS: 5000 INJECTION, SOLUTION INTRAVENOUS; SUBCUTANEOUS at 14:38

## 2018-03-22 RX ADMIN — ONDANSETRON 4 MG: 2 INJECTION INTRAMUSCULAR; INTRAVENOUS at 14:39

## 2018-03-22 RX ADMIN — GUAIFENESIN 1200 MG: 600 TABLET, EXTENDED RELEASE ORAL at 08:23

## 2018-03-22 RX ADMIN — HEPARIN SODIUM 5000 UNITS: 5000 INJECTION, SOLUTION INTRAVENOUS; SUBCUTANEOUS at 06:23

## 2018-03-22 RX ADMIN — GUAIFENESIN 1200 MG: 600 TABLET, EXTENDED RELEASE ORAL at 21:38

## 2018-03-22 RX ADMIN — HEPARIN SODIUM 5000 UNITS: 5000 INJECTION, SOLUTION INTRAVENOUS; SUBCUTANEOUS at 21:39

## 2018-03-22 RX ADMIN — Medication 10 ML: at 06:17

## 2018-03-22 RX ADMIN — CEFTRIAXONE 1 G: 1 INJECTION, POWDER, FOR SOLUTION INTRAMUSCULAR; INTRAVENOUS at 11:42

## 2018-03-22 NOTE — PROGRESS NOTES
Problem: Interdisciplinary Rounds  Goal: Interdisciplinary Rounds  Interdisciplinary team rounds were held 3/22/2018 with the following team members:Care Management, Nursing, Nutrition, Pharmacy, Physical Therapy and Physician and the patient was not avialable. Plan of care discussed. See clinical pathway and/or care plan for interventions and desired outcomes.

## 2018-03-22 NOTE — PROGRESS NOTES
Hospitalist Progress Note    3/22/2018  Admit Date: 3/20/2018  6:30 AM   NAME: Katie Zhu   :  1945   DOS:              18  MRN:  [id number]   Attending: Sirena Orozco MD  PCP:  Aretha Bueno MD  Treatment Team: Attending Provider: Sirena Orozco MD; Consulting Provider: Andre Arredondo MD; Utilization Review: Tanja Monreal RN    Full Code     SUBJECTIVE:   As previously documented: \" Ms. Dennis Xiong is a 66 yo F with PMHx of OA  And Obesity, although not on any medications at home who works at [employer] lab and presented to ED complaining of sudden right shoulder blade pain that lasted approx 15 min, 6/10 in intensity with radiation on her back and was associated with mild nausea. Complained of nonproductive cough for last month and some coughing spells with food intake. Complained of SOB and occasional mild LE edema, chest heaviness, 5/10, w/o radiation that lasted less than 5 min. Denies any spikes of fever. ED work-up showed WBC: 4.6, Plts:126, BNP:15, Trop I:0, D-dimer:1.10. CXR w/o acute cardiopulmonary disease. CTA chest w/o PE, patchy airspace disease in the upper lung fields, possible pneumonitis and marked splenomegaly. Had hypoxia 88% on Ra and  Was placed on O2 supplementation. Started on Rocephin/Azithromycin. US abdomen with splenomegaly 24 cm. Mono screen, CMV, acute hepatitis panel, Mycoplasma and HIV negative. \"         18  Ms. Katie Zhu denies nay new concerns. Afebrile. Seen by hematology who reccommended further work-up         10+ ROS reviewed and negative except for positive in HPI.    Allergies   Allergen Reactions    Augmentin [Amoxicillin-Pot Clavulanate] Nausea and Vomiting     Current Facility-Administered Medications   Medication Dose Route Frequency    azithromycin (ZITHROMAX) tablet 500 mg  500 mg Oral DAILY    cefTRIAXone (ROCEPHIN) 1 g in 0.9% sodium chloride (MBP/ADV) 50 mL  1 g IntraVENous Q24H    sodium chloride (NS) flush 5-10 mL  5-10 mL IntraVENous Q8H  sodium chloride (NS) flush 5-10 mL  5-10 mL IntraVENous PRN    acetaminophen (TYLENOL) tablet 650 mg  650 mg Oral Q4H PRN    HYDROcodone-acetaminophen (NORCO) 5-325 mg per tablet 1 Tab  1 Tab Oral Q4H PRN    heparin (porcine) injection 5,000 Units  5,000 Units SubCUTAneous Q8H    ondansetron (ZOFRAN) injection 4 mg  4 mg IntraVENous Q4H PRN    guaiFENesin ER (MUCINEX) tablet 1,200 mg  1,200 mg Oral Q12H         Immunization History   Administered Date(s) Administered    Pneumococcal Polysaccharide (PPSV-23) 2015    TB Skin Test (PPD) Intradermal 2015, 2018     Objective:     Patient Vitals for the past 24 hrs:   Temp Pulse Resp BP SpO2   18 1500 96 °F (35.6 °C) 82 18 129/61 96 %   18 1053 96 °F (35.6 °C) 68 18 150/66 94 %   18 0739 96.8 °F (36 °C) 68 18 117/57 93 %   18 0310 98.1 °F (36.7 °C) 71 18 129/78 92 %   18 2327 98.3 °F (36.8 °C) 72 18 122/76 93 %   18 1836 97.6 °F (36.4 °C) 74 18 141/70 96 %     Temp (24hrs), Av.1 °F (36.2 °C), Min:96 °F (35.6 °C), Max:98.3 °F (36.8 °C)    Oxygen Therapy  O2 Sat (%): 96 % (18 1500)  Pulse via Oximetry: 65 beats per minute (18 0930)  O2 Device: Room air (1848)  O2 Flow Rate (L/min): 2 l/min (1845)  Oxygen Therapy  O2 Sat (%): 96 % (18 1500)  Pulse via Oximetry: 65 beats per minute (18 0930)  O2 Device: Room air (1848)  O2 Flow Rate (L/min): 2 l/min (1845)    Physical Exam:  General:         Alert, cooperative, no distress . Afebrile. Obese  HEENT:               NCAT. No obvious deformity. Nares normal. No drainage  Lungs:                  CTABL. No wheezing/rhonchi/rales RA  Cardiovascular:   RRR. No m/r/g. Trace pedal edema b/l. +2 PT/DT pulses b/l. Abdomen:       S/nt/nd. Bowel sounds normal. .   Skin:         No rashes or lesions. Not Jaundiced  Neurologic:    AAOx3. CN II- XII grossly WNL. No gross focal deficit. Moves all extremities. Normal sensory. Normal gait. Psychiatric:         Good mood. Normal affect. Denies any SI/HI. DIAGNOSTIC STUDIES      Data Review:   Recent Results (from the past 24 hour(s))   CBC W/O DIFF    Collection Time: 03/22/18  5:39 AM   Result Value Ref Range    WBC 3.8 (L) 4.3 - 11.1 K/uL    RBC 4.50 4.05 - 5.25 M/uL    HGB 11.7 11.7 - 15.4 g/dL    HCT 36.8 35.8 - 46.3 %    MCV 81.8 79.6 - 97.8 FL    MCH 26.0 (L) 26.1 - 32.9 PG    MCHC 31.8 31.4 - 35.0 g/dL    RDW 13.9 11.9 - 14.6 %    PLATELET 008 (L) 745 - 450 K/uL    MPV 10.5 (L) 10.8 - 14.1 FL   LIPID PANEL    Collection Time: 03/22/18  5:39 AM   Result Value Ref Range    LIPID PROFILE          Cholesterol, total 119 <200 MG/DL    Triglyceride 100 35 - 150 MG/DL    HDL Cholesterol 37 (L) 40 - 60 MG/DL    LDL, calculated 62 <100 MG/DL    VLDL, calculated 20 6.0 - 23.0 MG/DL    CHOL/HDL Ratio 3.2     METABOLIC PANEL, BASIC    Collection Time: 03/22/18  5:39 AM   Result Value Ref Range    Sodium 140 136 - 145 mmol/L    Potassium 4.3 3.5 - 5.1 mmol/L    Chloride 105 98 - 107 mmol/L    CO2 27 21 - 32 mmol/L    Anion gap 8 7 - 16 mmol/L    Glucose 130 (H) 65 - 100 mg/dL    BUN 16 8 - 23 MG/DL    Creatinine 0.69 0.6 - 1.0 MG/DL    GFR est AA >60 >60 ml/min/1.73m2    GFR est non-AA >60 >60 ml/min/1.73m2    Calcium 9.0 8.3 - 10.4 MG/DL   LD    Collection Time: 03/22/18  4:18 PM   Result Value Ref Range     (H) 110 - 210 U/L   MISC.  LAB TEST    Collection Time: 03/22/18  4:20 PM   Result Value Ref Range    Test Description: JAK2     Results: PENDING        All Micro Results     Procedure Component Value Units Date/Time    CULTURE, BLOOD [[id number]] Collected:  03/20/18 1031    Order Status:  Completed Specimen:  Whole Blood from Blood Updated:  03/22/18 0738     Special Requests: RIGHT ANTECUBITAL        Culture result: NO GROWTH 2 DAYS       CULTURE, BLOOD [[id number]] Collected:  03/20/18 1047    Order Status:  Completed Specimen:  Whole Blood from Blood Updated:  03/22/18 0738     Special Requests: --        RIGHT  Antecubital       Culture result: NO GROWTH 2 DAYS       MYCOPLASMA AB, IGG/IGM [[id number]]  (Abnormal) Collected:  03/20/18 1554    Order Status:  Completed Specimen:  Serum from Serum Updated:  03/22/18 0135     M. pneumoniae Ab, IgG 156 (H) U/mL       (NOTE)                              Negative:           <100                              Indeterminate: 100 - 320                              Positive:           >320  The reference interval established is intended as a  baseline only. Values >100 may indicate a recent  infection with Mycoplasma pneumoniae and need to be  confirmed either by a positive IgM result and/or an  additional specimen drawn 2-4 weeks later showing a  significant increase in antibody levels. M. pneumoniae Ab, IgM <770 U/mL       (NOTE)                              Negative            <770  Clinically significant amount of M. pneumoniae antibody  not detected. Low Positive   770 - 12  M. pneumoniae specific IgM presumptively detected. It  is recommended that another sample be collected 1-2  weeks later to assure reactivity. Positive            >950  Highly significant amount of M. pneumoniae specific  IgM antibody detected. Performed At: [address] [id number]  Makenna Scherer MD ZB:[id number]         COSME/ Mai Posey (RAPID TEST) [[id number]] Collected:  03/20/18 1047    Order Status:  Completed Specimen:  Nasopharyngeal from Nasal washing Updated:  03/20/18 1112     Influenza A Ag NEGATIVE          NEGATIVE FOR THE PRESENCE OF INFLUENZA A ANTIGEN  INFECTION DUE TO INFLUENZA A CANNOT BE RULED OUT. BECAUSE THE ANTIGEN PRESENT IN THE SAMPLE MAY BE BELOW  THE DETECTION LIMIT OF THE TEST.   A NEGATIVE TEST IS PRESUMPTIVE AND IT IS RECOMMENDED THAT THESE RESULTS BE CONFIRMED BY VIRAL CULTURE OR AN FDA-CLEARED INFLUENZA A AND B MOLECULAR ASSAY. Influenza B Ag NEGATIVE          NEGATIVE FOR THE PRESENCE OF INFLUENZA B ANTIGEN  INFECTION DUE TO INFLUENZA B CANNOT BE RULED OUT. BECAUSE THE ANTIGEN PRESENT IN THE SAMPLE MAY BE BELOW  THE DETECTION LIMIT OF THE TEST. A NEGATIVE TEST IS PRESUMPTIVE AND IT IS RECOMMENDED THAT THESE RESULTS BE CONFIRMED BY VIRAL CULTURE OR AN FDA-CLEARED INFLUENZA A AND B MOLECULAR ASSAY. Imaging /Procedures /Studies:    CXR Results  (Last 48 hours)               03/20/18 0645  XR CHEST PORT Final result    Impression:  IMPRESSION: No acute cardiopulmonary disease. Narrative:  EXAM:  XR CHEST PORT       INDICATION:  cp       COMPARISON:  11/19/2015       FINDINGS: A portable AP radiograph of the chest was obtained at 0647 hours. Calcified granuloma in the right lower lobe. The lungs are clear. The cardiac   and mediastinal contours and pulmonary vascularity are normal.  The bones and   soft tissues are grossly within normal limits. CT Results  (Last 48 hours)               03/20/18 0833  CT CHEST W CONT Final result    Impression:  IMPRESSION:     1. No CT evidence of pulmonary embolus. 2.  Patchy airspace disease in the upper lung fields, possible pneumonitis. 3.  Marked splenomegaly. Narrative:  Chest CT       INDICATION:  Chest pain and shortness of breath, elevated d-dimer       Multiple axial images were obtained through the chest during intravenous   infusion of 100mL of Isovue 370. Coronal reformats were also evaluated. Radiation dose reduction techniques were used for this study: All CT scans   performed at this facility use one or all of the following: Automated exposure   control, adjustment of the mA and/or kVp according to patient's size, iterative   reconstruction. FINDINGS: There is normal enhancement of the major pulmonary vessels. There is   no CT evidence of pulmonary embolus.  There is also normal enhancement of the   aorta. There is no dissection or aneurysm. There is minimal patchy groundglass density in the upper lung fields. There are   no pulmonary masses. There are no effusions. There is no significant   mediastinal or axillary adenopathy. There are no bony lesions. There is   splenomegaly. The spleen measures at least 16 cm in length. Us Abd Comp  Result Date: 3/20/2018  IMPRESSION: Splenomegaly, 24 cm. Normal hepatopedal flow in the portal vein. No perisplenic or splenorenal varices appreciated. Liver is unremarkable. No ascites. Duplex Lower Ext Venous Bilat  Result Date: 3/20/2018  IMPRESSION: No deep vein thrombosis identified in either lower extremity. Results for orders placed or performed during the hospital encounter of 18   2D ECHO COMPLETE ADULT (TTE) W OR [address] [facility] [address] [facility], [address] [facility]  [phone]    Transthoracic Echocardiogram  2D, M-mode, Doppler, and Color Doppler    Patient: Pepe Doll  MR #: [id number]  : 1945  Age: 67 years  Gender: Female  Study date: 20-Mar-2018  Account #: [de-identified]  Height: 63 in  Weight: 245.5 lb  BSA: 2.11 mï¾²  Status:Routine  Location: 375  BP: 133/ 71    Allergies: AMOXICILLIN-POT CLAVULANATE    Sonographer:  Susana Montero 71:  [facility]  Referring Physician:  Danielle Lopez MD  Reading Physician:  Rogelio Hinton MD [facility]    INDICATIONS: Chest Pain; Edema; Shortness of breath; massive splenomegaly. PROCEDURE: This was a routine study. A transthoracic echocardiogram was  performed. The study included complete 2D imaging, M-mode, complete spectral  Doppler, and color Doppler. Image quality was adequate. LEFT VENTRICLE: Size was normal. Systolic function was normal. Ejection  fraction was estimated in the range of 55 % to 60 %. There were no regional  wall motion abnormalities.  Wall thickness was normal.    RIGHT VENTRICLE: The size was normal. Systolic function was normal. The  tricuspid jet envelope definition was inadequate for estimation of RV   systolic  pressure. LEFT ATRIUM: The atrium was moderately dilated. RIGHT ATRIUM: The atrium was mildly dilated. SYSTEMIC VEINS: IVC: The inferior vena cava was not well visualized. The  inferior vena cava was normal in size. AORTIC VALVE: The valve was trileaflet. Leaflets exhibited mild sclerosis. There was no evidence for stenosis. There was no insufficiency. MITRAL VALVE: There was mild annular calcification. There was no evidence for  stenosis. There was mild regurgitation. TRICUSPID VALVE: The valve structure was normal. There was no evidence for  stenosis. There was trivial regurgitation. PULMONIC VALVE: The valve structure was normal. There was no evidence for  stenosis. There was no insufficiency. PERICARDIUM: There was no pericardial effusion. AORTA: The root exhibited normal size. SUMMARY:    -  Left ventricle: Systolic function was normal. Ejection fraction was  estimated in the range of 55 % to 60 %. There were no regional wall motion  abnormalities. -  Left atrium: The atrium was moderately dilated. -  Right atrium: The atrium was mildly dilated. -  Mitral valve: There was mild annular calcification. There was mild  regurgitation. SYSTEM MEASUREMENT TABLES    2D mode  AoR Diam (2D): 2.6 cm  LA Dimension (2D): 4 cm  Left Atrium Systolic Volume Index; Method of Disks, Biplane; 2D mode;: 36   ml/m2  IVS/LVPW (2D): 0.8  IVSd (2D): 0.7 cm  LVIDd (2D): 4.4 cm  LVIDs (2D): 2.5 cm  LVOT Area (2D): 3.1 cm2  LVPWd (2D): 0.9 cm    Tissue Doppler Imaging  LV Peak Early Ortiz Tissue El; Lateral MA (TDI): 10.9 cm/s  LV Peak Early Ortiz Tissue El; Medial MA (TDI): 7.6 cm/s    Unspecified Scan Mode  Peak Grad; Mean; Antegrade Flow: 12 mm[Hg]  Vmax;  Antegrade Flow: 174 cm/s  LVOT Diam: 2 cm  MV Peak El/LV Peak Tissue El E-Wave; Lateral MA: 7.7  MV Peak El/LV Peak Tissue El E-Wave; Medial MA: 11    Prepared and signed by    Viviana Castellon. MD [facility]  Signed 20-Mar-2018 17:21:59         Labs and Studies from previous 24 hours have been personally reviewed by myself [address] Problems as of 3/22/2018  Date Reviewed: 11/19/2015          Codes Class Noted - Resolved POA    * (Principal)Acute respiratory failure (RUST 75.) ICD-10-CM: J96.00  ICD-9-CM: 518.81  3/20/2018 - Present Yes        Elevated troponin I level ICD-10-CM: R74.8  ICD-9-CM: 790.6  3/20/2018 - Present Yes        Chest pain ICD-10-CM: R07.9  ICD-9-CM: 786.50  3/20/2018 - Present Yes        CAP (community acquired pneumonia) ICD-10-CM: J18.9  ICD-9-CM: 171  3/20/2018 - Present Yes        Splenomegaly ICD-10-CM: R16.1  ICD-9-CM: 789.2  3/20/2018 - Present Yes        GERD (gastroesophageal reflux disease) ICD-10-CM: K21.9  ICD-9-CM: 530.81  Unknown - Present Yes    Overview Signed 11/19/2015  1:35 PM by MACKENZIE Hugo     diet controlled - very rare             Obesity, morbid (RUST 75.) ICD-10-CM: E66.01  ICD-9-CM: 278.01  12/26/2009 - Present Yes              Plan:  Acute respiratory failure:  - unclear etiology, although likely due to CAP  - BNP low, although with LE edema dn marked splenomegaly - TTE: EF:55-60%,   -on RA        CAP:  - on Rocephin/Azythromycin  - blood/ cultures negative   - speech evaluation w/o overt aspiration        Chest pain:  - serial Trop I/ EKG  - initial Trop I/EKG.  w/o acute pathology  - TTE: EF:55-60%, no regional wall motion abnormalities  - ASa 81 mg  - check lipid panel  - will need outpatient cardiology follow-up      Splenomegaly:  - massive   - unclear etiology  -  PPD  pending  -  hepatitis panel, CMP, HIV, Mycoplasma negative  - US abdomen with enlarged spleen 24 cm  - blood smear  - seen by  hematology  And work-up in progress - appreciate the help        GERD:  - denies - monitor        Obesity:  -educated    DVT Prophylaxis: Heparin SQ  CODE Status: Full CODE  Plan of Care Discussed with: patient.  Care team.  Medical Risk: moderate  Disposition: home when medically stable and ok with hematology    Ricardo Driscoll MD  03/22/18

## 2018-03-22 NOTE — PROGRESS NOTES
Am assessment completed. Pt is alert and oriented x 3, lungs diminished but clear. Dry cough. Verbalizes needs well. Denies pain. Appetite appears to be good. Independently moving around room. Continue to monitor.

## 2018-03-22 NOTE — PROGRESS NOTES
Shift assessment complete. Patient alert and oriented x 4. Respirations even, regular, and dypsnea with exertion. Lung sound clear. Bowel sounds active and abdomen soft, obese, and tender. Denies pain. Bed low, locked, and call light within reach.

## 2018-03-22 NOTE — PROGRESS NOTES
Pt from bed to chair. Taking medications whole. Iv flushes well. Re-taped per pt request. Pt co of feeling light headed and dizzy at times. Pt instructed to call for any needs.

## 2018-03-22 NOTE — PROGRESS NOTES
Shift assessment complete. Patient and oriented x 4. Respirations even regular, dyspnea with exertion. Lung sounds clear. Bowel sounds active and abdomen soft tender and obese. Denies pain. Bed low locked and call light within reach.

## 2018-03-23 ENCOUNTER — APPOINTMENT (OUTPATIENT)
Dept: ULTRASOUND IMAGING | Age: 73
End: 2018-03-23
Attending: NURSE PRACTITIONER
Payer: MEDICARE

## 2018-03-23 VITALS
WEIGHT: 242.7 LBS | RESPIRATION RATE: 18 BRPM | HEIGHT: 63 IN | OXYGEN SATURATION: 96 % | SYSTOLIC BLOOD PRESSURE: 109 MMHG | TEMPERATURE: 97.8 F | BODY MASS INDEX: 43 KG/M2 | HEART RATE: 68 BPM | DIASTOLIC BLOOD PRESSURE: 64 MMHG

## 2018-03-23 PROBLEM — J96.00 ACUTE RESPIRATORY FAILURE (HCC): Status: RESOLVED | Noted: 2018-03-20 | Resolved: 2018-03-23

## 2018-03-23 PROBLEM — E66.9 OBESITY: Status: RESOLVED | Noted: 2018-03-20 | Resolved: 2018-03-23

## 2018-03-23 PROBLEM — E66.9 OBESITY: Status: ACTIVE | Noted: 2018-03-20

## 2018-03-23 LAB
ALBUMIN SERPL ELPH-MCNC: 3.81 G/DL (ref 3.2–5.6)
ALBUMIN/GLOB SERPL: 1.4 {RATIO}
ALPHA1 GLOB SERPL ELPH-MCNC: 0.32 G/DL (ref 0.1–0.4)
ALPHA2 GLOB SERPL ELPH-MCNC: 0.63 G/DL (ref 0.4–1.2)
ANA SER QL: NEGATIVE
B-GLOBULIN SERPL QL ELPH: 1.08 G/DL (ref 0.6–1.3)
ERYTHROCYTE [DISTWIDTH] IN BLOOD BY AUTOMATED COUNT: 14.1 % (ref 11.9–14.6)
GAMMA GLOB MFR SERPL ELPH: 0.65 G/DL (ref 0.5–1.6)
HCT VFR BLD AUTO: 39.3 % (ref 35.8–46.3)
HGB BLD-MCNC: 12.3 G/DL (ref 11.7–15.4)
IGA SERPL-MCNC: 140 MG/DL (ref 85–499)
IGG SERPL-MCNC: 643 MG/DL (ref 610–1616)
IGM SERPL-MCNC: <10 MG/DL (ref 35–242)
KAPPA LC FREE SER-MCNC: 17.02 MG/L (ref 3.3–19.4)
KAPPA LC FREE/LAMBDA FREE SER: 1.7 {RATIO} (ref 0.26–1.65)
LAMBDA LC FREE SERPL-MCNC: 10.01 MG/L (ref 5.71–26.3)
M PROTEIN SERPL ELPH-MCNC: ABNORMAL G/DL
MCH RBC QN AUTO: 25.9 PG (ref 26.1–32.9)
MCHC RBC AUTO-ENTMCNC: 31.3 G/DL (ref 31.4–35)
MCV RBC AUTO: 82.7 FL (ref 79.6–97.8)
PLATELET # BLD AUTO: 146 K/UL (ref 150–450)
PMV BLD AUTO: 10.6 FL (ref 10.8–14.1)
PROT PATTERN SERPL ELPH-IMP: ABNORMAL
PROT PATTERN SPEC IFE-IMP: ABNORMAL
PROT SERPL-MCNC: 6.5 G/DL (ref 6.3–8.2)
RBC # BLD AUTO: 4.75 M/UL (ref 4.05–5.25)
RHEUMATOID FACT SER QL LA: NEGATIVE
SEE BELOW:, 164879: NORMAL
WBC # BLD AUTO: 4.4 K/UL (ref 4.3–11.1)

## 2018-03-23 PROCEDURE — 74011000258 HC RX REV CODE- 258: Performed by: HOSPITALIST

## 2018-03-23 PROCEDURE — 86777 TOXOPLASMA ANTIBODY: CPT | Performed by: HOSPITALIST

## 2018-03-23 PROCEDURE — 88184 FLOWCYTOMETRY/ TC 1 MARKER: CPT | Performed by: HOSPITALIST

## 2018-03-23 PROCEDURE — 96366 THER/PROPH/DIAG IV INF ADDON: CPT

## 2018-03-23 PROCEDURE — 99218 HC RM OBSERVATION: CPT

## 2018-03-23 PROCEDURE — 36415 COLL VENOUS BLD VENIPUNCTURE: CPT | Performed by: HOSPITALIST

## 2018-03-23 PROCEDURE — 74011250636 HC RX REV CODE- 250/636: Performed by: HOSPITALIST

## 2018-03-23 PROCEDURE — 76705 ECHO EXAM OF ABDOMEN: CPT

## 2018-03-23 PROCEDURE — 74011250637 HC RX REV CODE- 250/637: Performed by: HOSPITALIST

## 2018-03-23 PROCEDURE — 86480 TB TEST CELL IMMUN MEASURE: CPT | Performed by: HOSPITALIST

## 2018-03-23 PROCEDURE — 96372 THER/PROPH/DIAG INJ SC/IM: CPT

## 2018-03-23 PROCEDURE — 85027 COMPLETE CBC AUTOMATED: CPT | Performed by: HOSPITALIST

## 2018-03-23 PROCEDURE — 88185 FLOWCYTOMETRY/TC ADD-ON: CPT | Performed by: HOSPITALIST

## 2018-03-23 RX ORDER — AZITHROMYCIN 250 MG/1
500 TABLET, FILM COATED ORAL DAILY
Qty: 6 TAB | Refills: 0 | Status: SHIPPED | OUTPATIENT
Start: 2018-03-23 | End: 2018-03-23

## 2018-03-23 RX ORDER — CEFDINIR 300 MG/1
300 CAPSULE ORAL 2 TIMES DAILY
Qty: 10 CAP | Refills: 0 | Status: SHIPPED | OUTPATIENT
Start: 2018-03-23 | End: 2018-03-23

## 2018-03-23 RX ORDER — CEFDINIR 300 MG/1
300 CAPSULE ORAL 2 TIMES DAILY
Qty: 10 CAP | Refills: 0 | Status: SHIPPED | OUTPATIENT
Start: 2018-03-23 | End: 2018-03-28

## 2018-03-23 RX ORDER — GUAIFENESIN 100 MG/5ML
81 LIQUID (ML) ORAL DAILY
Qty: 30 TAB | Refills: 0 | Status: SHIPPED | OUTPATIENT
Start: 2018-03-23 | End: 2018-03-28

## 2018-03-23 RX ORDER — GUAIFENESIN 100 MG/5ML
81 LIQUID (ML) ORAL DAILY
Status: DISCONTINUED | OUTPATIENT
Start: 2018-03-23 | End: 2018-03-23 | Stop reason: HOSPADM

## 2018-03-23 RX ORDER — AZITHROMYCIN 250 MG/1
500 TABLET, FILM COATED ORAL DAILY
Qty: 6 TAB | Refills: 0 | Status: SHIPPED | OUTPATIENT
Start: 2018-03-23 | End: 2018-03-26

## 2018-03-23 RX ORDER — RANITIDINE 150 MG/1
150 TABLET, FILM COATED ORAL 2 TIMES DAILY
Qty: 60 TAB | Refills: 0 | Status: SHIPPED | OUTPATIENT
Start: 2018-03-23 | End: 2020-03-10

## 2018-03-23 RX ORDER — FAMOTIDINE 20 MG/1
20 TABLET, FILM COATED ORAL 2 TIMES DAILY
Status: DISCONTINUED | OUTPATIENT
Start: 2018-03-23 | End: 2018-03-23 | Stop reason: HOSPADM

## 2018-03-23 RX ORDER — AZITHROMYCIN 250 MG/1
500 TABLET, FILM COATED ORAL DAILY
Status: DISCONTINUED | OUTPATIENT
Start: 2018-03-23 | End: 2018-03-23 | Stop reason: HOSPADM

## 2018-03-23 RX ADMIN — GUAIFENESIN 1200 MG: 600 TABLET, EXTENDED RELEASE ORAL at 09:43

## 2018-03-23 RX ADMIN — Medication 10 ML: at 05:32

## 2018-03-23 RX ADMIN — ASPIRIN 81 MG 81 MG: 81 TABLET ORAL at 09:54

## 2018-03-23 RX ADMIN — HEPARIN SODIUM 5000 UNITS: 5000 INJECTION, SOLUTION INTRAVENOUS; SUBCUTANEOUS at 05:31

## 2018-03-23 RX ADMIN — FAMOTIDINE 20 MG: 20 TABLET, FILM COATED ORAL at 09:54

## 2018-03-23 RX ADMIN — AZITHROMYCIN 500 MG: 250 TABLET, FILM COATED ORAL at 09:43

## 2018-03-23 RX ADMIN — CEFTRIAXONE 1 G: 1 INJECTION, POWDER, FOR SOLUTION INTRAMUSCULAR; INTRAVENOUS at 09:54

## 2018-03-23 NOTE — DISCHARGE SUMMARY
Hospitalist Discharge Summary   Patient ID:  Alicia Cohen  [id number]  55 y.o.  1945  Admit date: 3/20/2018  6:30 AM  Discharge date and time: 3/23/2018  Attending: Beth Hobson MD  PCP:  MD Dr. Tino Levy MD   Treatment Team: Attending Provider: Beth Hobson MD; Consulting Provider: Jaguar Velarde MD; Utilization Review: Roma Cheng RN  Principal Diagnosis Acute respiratory failure [facility])   Active Problems:    Obesity, morbid ([facility] (12/26/2009)      GERD (gastroesophageal reflux disease) ()      Overview: diet controlled - very rare      Chest pain (3/20/2018)      CAP (community acquired pneumonia) (3/20/2018)      Splenomegaly (3/20/2018)       * Admission Diagnoses: Acute respiratory failure ([facility]  Acute respiratory failure ([facility]  * Discharge Diagnoses:    Hospital Problems as of 3/23/2018  Date Reviewed: 11/19/2015          Codes Class Noted - Resolved POA    Chest pain ICD-10-CM: R07.9  ICD-9-CM: 786.50  3/20/2018 - Present Yes        CAP (community acquired pneumonia) ICD-10-CM: J18.9  ICD-9-CM: 637  3/20/2018 - Present Yes        Splenomegaly ICD-10-CM: R16.1  ICD-9-CM: 789.2  3/20/2018 - Present Yes        GERD (gastroesophageal reflux disease) ICD-10-CM: K21.9  ICD-9-CM: 530.81  Unknown - Present Yes    Overview Signed 11/19/2015  1:35 PM by MACKENZIE Meier     diet controlled - very rare             Obesity, morbid ([facility] ICD-10-CM: E66.01  ICD-9-CM: 278.01  12/26/2009 - Present Yes        * (Principal)RESOLVED: Acute respiratory failure ([facility] ICD-10-CM: J96.00  ICD-9-CM: 518.81  3/20/2018 - 3/23/2018 Yes        RESOLVED: Obesity ICD-10-CM: E66.9  ICD-9-CM: 278.00  3/20/2018 - 3/23/2018 Yes                Hospital Course:  Please refer to the admission H&P for details of presentation.  In summary, Ms Alicia Cohen is a 67 y.o. female with PMHx of OA  and Obesity, although not on any medications at home who works at [employer] lab and presented to ED complaining of sudden right shoulder blade pain that lasted approx 15 min, 6/10 in intensity with radiation on her back and was associated with mild nausea. Complained of nonproductive cough for last month and some coughing spells with food intake. Complained of SOB and occasional mild LE edema, chest heaviness, 5/10, w/o radiation that lasted less than 5 min. Denies any spikes of fever. ED work-up showed WBC: 4.6, Plts:126, BNP:15, Trop I:0, D-dimer:1.10. CXR w/o acute cardiopulmonary disease. Remote telemetry and serial Trop I negative . Echocardiogram with  EF:55-60%, no regional wall motion abnormalities. CTA chest w/o PE, patchy airspace disease in the upper lung fields, possible pneumonitis and marked splenomegaly. Had hypoxia 88% on  Room air  and  was placed on O2 supplementation. Started on Rocephin/Azithromycin with improvement in her clinical status and was weaned off to room air. Speech evaluation without overt aspiration. CTA chest also showed marked  Splenomegaly. Infectious work-up that included Mono screen, hepatitis panel, CMP, HIV, Mycoplasma was negative. US abdomen with splenomegaly 24 cm. TB gold and toxoplasm is pending at the time of discharge. Hematology consulted who recommended further work-p with follow-up in outpatient. Ms. Jolene Conley was discharged home in a stable clinical condition with follow-up with PCP in 3-5 days with repeat CBC and CMP, with hematology in 1 week and with cardiology  With 1 week - likely will need stress test. Discussed with cardiology and recommended to posponed the test because of the splenomegaly.   The pending work-up needs to be followed by PCP and hematology        Consults:  IP CONSULT TO HEMATOLOGY    Diagnostic Study/Procedure results summary copied from within [facility] EMR:      Labs: Results:       Chemistry Recent Labs      03/22/18   0539  03/21/18   0358   GLU  130*  139*   NA  140  140   K  4.3  4.1   CL  105  104   CO2  27  26   BUN  16  16   CREA  0.69  0.67 CA  9.0  9.0   AGAP  8  10   TBILI   --   0.7   ALT   --   29   AP   --   75   TP   --   6.2*   ALB   --   3.4   GLOB   --   2.8   AGRAT   --   1.2      CBC w/Diff Recent Labs      03/23/18   0549  03/22/18   0539  03/21/18   0358   WBC  4.4  3.8*  3.8*   RBC  4.75  4.50  4.53   HGB  12.3  11.7  11.9   HCT  39.3  36.8  37.5   PLT  146*  145*  120*   GRANS   --    --   67   LYMPH   --    --   17   EOS   --    --   6                      Liver Enzymes Recent Labs      03/21/18 0358   TP  6.2*   ALB  3.4   AP  75   SGOT  34            All Micro Results     Procedure Component Value Units Date/Time    CULTURE, BLOOD [[id number]] Collected:  03/20/18 1031    Order Status:  Completed Specimen:  Whole Blood from Blood Updated:  03/23/18 0825     Special Requests: RIGHT ANTECUBITAL        Culture result: NO GROWTH 3 DAYS       CULTURE, BLOOD [[id number]] Collected:  03/20/18 1047    Order Status:  Completed Specimen:  Whole Blood from Blood Updated:  03/23/18 0825     Special Requests: --        RIGHT  Antecubital       Culture result: NO GROWTH 3 DAYS       QUANTIFERON TB GOLD [[id number]]     Order Status:  Sent Specimen:  Whole Blood from Blood     MYCOPLASMA AB, IGG/IGM [[id number]]  (Abnormal) Collected:  03/20/18 1554    Order Status:  Completed Specimen:  Serum from Serum Updated:  03/22/18 0135     M. pneumoniae Ab, IgG 156 (H) U/mL       (NOTE)                              Negative:           <100                              Indeterminate: 100 - 320                              Positive:           >320  The reference interval established is intended as a  baseline only. Values >100 may indicate a recent  infection with Mycoplasma pneumoniae and need to be  confirmed either by a positive IgM result and/or an  additional specimen drawn 2-4 weeks later showing a  significant increase in antibody levels.           M. pneumoniae Ab, IgM <770 U/mL       (NOTE)                              Negative <770  Clinically significant amount of M. pneumoniae antibody  not detected. Low Positive   770 - 12  M. pneumoniae specific IgM presumptively detected. It  is recommended that another sample be collected 1-2  weeks later to assure reactivity. Positive            >950  Highly significant amount of M. pneumoniae specific  IgM antibody detected. Performed At: [address] [id number]  Johan Carroll MD JU:[id number]         [address] (RAPID TEST) [[id number]] Collected:  03/20/18 1047    Order Status:  Completed Specimen:  Nasopharyngeal from Nasal washing Updated:  03/20/18 1112     Influenza A Ag NEGATIVE          NEGATIVE FOR THE PRESENCE OF INFLUENZA A ANTIGEN  INFECTION DUE TO INFLUENZA A CANNOT BE RULED OUT. BECAUSE THE ANTIGEN PRESENT IN THE SAMPLE MAY BE BELOW  THE DETECTION LIMIT OF THE TEST. A NEGATIVE TEST IS PRESUMPTIVE AND IT IS RECOMMENDED THAT THESE RESULTS BE CONFIRMED BY VIRAL CULTURE OR AN FDA-CLEARED INFLUENZA A AND B MOLECULAR ASSAY. Influenza B Ag NEGATIVE          NEGATIVE FOR THE PRESENCE OF INFLUENZA B ANTIGEN  INFECTION DUE TO INFLUENZA B CANNOT BE RULED OUT. BECAUSE THE ANTIGEN PRESENT IN THE SAMPLE MAY BE BELOW  THE DETECTION LIMIT OF THE TEST. A NEGATIVE TEST IS PRESUMPTIVE AND IT IS RECOMMENDED THAT THESE RESULTS BE CONFIRMED BY VIRAL CULTURE OR AN FDA-CLEARED INFLUENZA A AND B MOLECULAR ASSAY. Imaging /Procedures /Studies:    CXR Results  (Last 48 hours)               03/20/18 0645  XR CHEST PORT Final result    Impression:  IMPRESSION: No acute cardiopulmonary disease. Narrative:  EXAM:  XR CHEST PORT       INDICATION:  cp       COMPARISON:  11/19/2015       FINDINGS: A portable AP radiograph of the chest was obtained at 0647 hours. Calcified granuloma in the right lower lobe. The lungs are clear.   The cardiac   and mediastinal contours and pulmonary vascularity are normal.  The bones and   soft tissues are grossly within normal limits. CT Results  (Last 48 hours)               03/20/18 0833  CT CHEST W CONT Final result    Impression:  IMPRESSION:     1. No CT evidence of pulmonary embolus. 2.  Patchy airspace disease in the upper lung fields, possible pneumonitis. 3.  Marked splenomegaly. Narrative:  Chest CT       INDICATION:  Chest pain and shortness of breath, elevated d-dimer       Multiple axial images were obtained through the chest during intravenous   infusion of 100mL of Isovue 370. Coronal reformats were also evaluated. Radiation dose reduction techniques were used for this study: All CT scans   performed at this facility use one or all of the following: Automated exposure   control, adjustment of the mA and/or kVp according to patient's size, iterative   reconstruction. FINDINGS: There is normal enhancement of the major pulmonary vessels. There is   no CT evidence of pulmonary embolus. There is also normal enhancement of the   aorta. There is no dissection or aneurysm. There is minimal patchy groundglass density in the upper lung fields. There are   no pulmonary masses. There are no effusions. There is no significant   mediastinal or axillary adenopathy. There are no bony lesions. There is   splenomegaly. The spleen measures at least 16 cm in length. Us Abd Comp  Result Date: 3/20/2018  IMPRESSION: Splenomegaly, 24 cm. Normal hepatopedal flow in the portal vein. No perisplenic or splenorenal varices appreciated. Liver is unremarkable. No ascites. Duplex Lower Ext Venous Bilat  Result Date: 3/20/2018  IMPRESSION: No deep vein thrombosis identified in either lower extremity.        Results for orders placed or performed during the hospital encounter of 03/20/18   2D ECHO COMPLETE ADULT (TTE) W OR [address]  [address] [facility], [address] [facility]  [phone]    Transthoracic Echocardiogram  2D, M-mode, Doppler, and Color Doppler    Patient: Jersey Badillo  MR #: [id number]  : 1945  Age: 67 years  Gender: Female  Study date: 20-Mar-2018  Account #: [de-identified]  Height: 63 in  Weight: 245.5 lb  BSA: 2.11 mï¾²  Status:Routine  Location: 375  BP: 133/ 71    Allergies: AMOXICILLIN-POT CLAVULANATE    Sonographer:  Cely Collins [facility]  Group:  [facility]  Referring Physician:  Jaleesa Lagos MD  Reading Physician:  Fariba Mann. MD [facility] [place]    INDICATIONS: Chest Pain; Edema; Shortness of breath; massive splenomegaly. PROCEDURE: This was a routine study. A transthoracic echocardiogram was  performed. The study included complete 2D imaging, M-mode, complete spectral  Doppler, and color Doppler. Image quality was adequate. LEFT VENTRICLE: Size was normal. Systolic function was normal. Ejection  fraction was estimated in the range of 55 % to 60 %. There were no regional  wall motion abnormalities. Wall thickness was normal.    RIGHT VENTRICLE: The size was normal. Systolic function was normal. The  tricuspid jet envelope definition was inadequate for estimation of RV   systolic  pressure. LEFT ATRIUM: The atrium was moderately dilated. RIGHT ATRIUM: The atrium was mildly dilated. SYSTEMIC VEINS: IVC: The inferior vena cava was not well visualized. The  inferior vena cava was normal in size. AORTIC VALVE: The valve was trileaflet. Leaflets exhibited mild sclerosis. There was no evidence for stenosis. There was no insufficiency. MITRAL VALVE: There was mild annular calcification. There was no evidence for  stenosis. There was mild regurgitation. TRICUSPID VALVE: The valve structure was normal. There was no evidence for  stenosis. There was trivial regurgitation. PULMONIC VALVE: The valve structure was normal. There was no evidence for  stenosis.  There was no insufficiency. PERICARDIUM: There was no pericardial effusion. AORTA: The root exhibited normal size. SUMMARY:    -  Left ventricle: Systolic function was normal. Ejection fraction was  estimated in the range of 55 % to 60 %. There were no regional wall motion  abnormalities. -  Left atrium: The atrium was moderately dilated. -  Right atrium: The atrium was mildly dilated. -  Mitral valve: There was mild annular calcification. There was mild  regurgitation. SYSTEM MEASUREMENT TABLES    2D mode  AoR Diam (2D): 2.6 cm  LA Dimension (2D): 4 cm  Left Atrium Systolic Volume Index; Method of Disks, Biplane; 2D mode;: 36   ml/m2  IVS/LVPW (2D): 0.8  IVSd (2D): 0.7 cm  LVIDd (2D): 4.4 cm  LVIDs (2D): 2.5 cm  LVOT Area (2D): 3.1 cm2  LVPWd (2D): 0.9 cm    Tissue Doppler Imaging  LV Peak Early Ortiz Tissue El; Lateral MA (TDI): 10.9 cm/s  LV Peak Early Ortiz Tissue El; Medial MA (TDI): 7.6 cm/s    Unspecified Scan Mode  Peak Grad; Mean; Antegrade Flow: 12 mm[Hg]  Vmax; Antegrade Flow: 174 cm/s  LVOT Diam: 2 cm  MV Peak El/LV Peak Tissue El E-Wave; Lateral MA: 7.7  MV Peak El/LV Peak Tissue El E-Wave; Medial MA: 11    Prepared and signed by    Shankar Hitnon MD [facility]  Signed 20-Mar-2018 17:21:59           Discharge Exam:  Patient Vitals for the past 24 hrs:   Temp Pulse Resp BP SpO2   18 0654 97.8 °F (36.6 °C) 68 18 109/64 96 %   18 0319 98.4 °F (36.9 °C) 83 17 152/80 96 %   18 2304 98 °F (36.7 °C) 79 18 133/63 95 %   18 1811 98.6 °F (37 °C) 74 18 166/66 99 %   18 1500 96 °F (35.6 °C) 82 18 129/61 96 %   18 1053 96 °F (35.6 °C) 68 18 150/66 94 %     Temp (24hrs), Av.5 °F (36.4 °C), Min:96 °F (35.6 °C), Max:98.6 °F (37 °C)    Oxygen Therapy  O2 Sat (%): 96 % (18 0654)  Pulse via Oximetry: 65 beats per minute (18 0930)  O2 Device: Room air (18 0748)  O2 Flow Rate (L/min): 2 l/min (18 0745)  No intake or output data in the 24 hours ending 03/23/18 0848    Physical Exam:  General:         Alert, cooperative, no distress . Afebrile. Obese  HEENT:               NCAT. No obvious deformity. Nares normal. No drainage  Lungs:                  CTABL. No wheezing/rhonchi/rales RA  Cardiovascular:   RRR. No m/r/g. Trace pedal edema b/l. +2 PT/DT pulses b/l. Abdomen:       S/nt/nd. Bowel sounds normal. .   Skin:         No rashes or lesions. Not Jaundiced  Neurologic:    AAOx3. CN II- XII grossly WNL. No gross focal deficit. Moves all extremities. Normal sensory. Normal gait. Psychiatric:         Good mood. Normal affect. Denies any SI/HI. Disposition: home  Discharge Condition: stable  Patient Instructions:   Current Discharge Medication List      START taking these medications    Details   azithromycin (ZITHROMAX) 250 mg tablet Take 2 Tabs by mouth daily for 3 days. Qty: 6 Tab, Refills: 0      cefdinir (OMNICEF) 300 mg capsule Take 1 Cap by mouth two (2) times a day for 5 days. Qty: 10 Cap, Refills: 0      guaiFENesin (MUCINEX) 1,200 mg Ta12 ER tablet Take 1 Tab by mouth every twelve (12) hours for 5 days. Qty: 10 Tab, Refills: 0      aspirin 81 mg chewable tablet Take 1 Tab by mouth daily. Qty: 30 Tab, Refills: 0      raNITIdine (ZANTAC) 150 mg tablet Take 1 Tab by mouth two (2) times a day. Qty: 60 Tab, Refills: 0             Activity: No lifting, Strenuous exercise or contact sports until clear by physician. Diet: DIET REGULAR  Wound Care: None needed      Full Code   Surrogate decision maker: discussed the solis with Ms. Dhillon.  Care team  Pneumonia and flu vaccine to be administered at discharge per hospital protocol     Follow-up  Follow-up Information     Follow up With Details Comments Contact Info    Aamir Sims MD In 3 days Dr. Jazmyn Dumas MD. Repeat CBC and CMP Patient can only remember the practice name and not the physician      Karen Viveros MD In 1 week  [id number] [facility] [id number]  [phone]      [facility] In 5 days chest pain. needs stress test [place] [id number] [phone]          Time spent to discharge patient 27 minutes.    Signed:  Kirill Coy MD  3/23/2018

## 2018-03-23 NOTE — PROGRESS NOTES
Discharge instructions given to patient along with a copy of discharge papers and educational material. Rxs given. IV removed tip intact pressure dsg applied. Pt to call when ride gets here.

## 2018-03-23 NOTE — DISCHARGE INSTRUCTIONS
DISCHARGE SUMMARY from Nurse    PATIENT INSTRUCTIONS:    After general anesthesia or intravenous sedation, for 24 hours or while taking prescription Narcotics:  · Limit your activities  · Do not drive and operate hazardous machinery  · Do not make important personal or business decisions  · Do  not drink alcoholic beverages  · If you have not urinated within 8 hours after discharge, please contact your surgeon on call. Report the following to your surgeon:  · Excessive pain, swelling, redness or odor of or around the surgical area  · Temperature over 100.5  · Nausea and vomiting lasting longer than 4 hours or if unable to take medications  · Any signs of decreased circulation or nerve impairment to extremity: change in color, persistent  numbness, tingling, coldness or increase pain  · Any questions    What to do at Home:  Recommended activity: Activity as tolerated,     If you experience any of the above symptoms, please follow up with MD.    *  Please give a list of your current medications to your Primary Care Provider. *  Please update this list whenever your medications are discontinued, doses are      changed, or new medications (including over-the-counter products) are added. *  Please carry medication information at all times in case of emergency situations. These are general instructions for a healthy lifestyle:    No smoking/ No tobacco products/ Avoid exposure to second hand smoke  Surgeon General's Warning:  Quitting smoking now greatly reduces serious risk to your health.     Obesity, smoking, and sedentary lifestyle greatly increases your risk for illness    A healthy diet, regular physical exercise & weight monitoring are important for maintaining a healthy lifestyle    You may be retaining fluid if you have a history of heart failure or if you experience any of the following symptoms:  Weight gain of 3 pounds or more overnight or 5 pounds in a week, increased swelling in our hands or feet or shortness of breath while lying flat in bed. Please call your doctor as soon as you notice any of these symptoms; do not wait until your next office visit. Recognize signs and symptoms of STROKE:    F-face looks uneven    A-arms unable to move or move unevenly    S-speech slurred or non-existent    T-time-call 911 as soon as signs and symptoms begin-DO NOT go       Back to bed or wait to see if you get better-TIME IS BRAIN. Warning Signs of HEART ATTACK     Call 911 if you have these symptoms:   Chest discomfort. Most heart attacks involve discomfort in the center of the chest that lasts more than a few minutes, or that goes away and comes back. It can feel like uncomfortable pressure, squeezing, fullness, or pain.  Discomfort in other areas of the upper body. Symptoms can include pain or discomfort in one or both arms, the back, neck, jaw, or stomach.  Shortness of breath with or without chest discomfort.  Other signs may include breaking out in a cold sweat, nausea, or lightheadedness. Don't wait more than five minutes to call 911 - MINUTES MATTER! Fast action can save your life. Calling 911 is almost always the fastest way to get lifesaving treatment. Emergency Medical Services staff can begin treatment when they arrive -- up to an hour sooner than if someone gets to the hospital by car. The discharge information has been reviewed with the patient. The patient verbalized understanding. Discharge medications reviewed with the patient and appropriate educational materials and side effects teaching were provided. ___________________________________________________________________________________________________________________________________Finish full course of azithromycin and Cefdinir  Follow-up with PCP in 3-5 days for further work-up for your splenomegaly. Repeat CBC and CMP at that time. Toxoplasma, TB gold pending at the time of discharge  Follow-up w1th hematology in 1 week. Multiple laboratory studies pending - discussed the results with them . [facility] 24h urine collection and bring it to the lab. Avoid strenuous exercise of contact sports           Chest Pain: Care Instructions  Your Care Instructions    There are many things that can cause chest pain. Some are not serious and will get better on their own in a few days. But some kinds of chest pain need more testing and treatment. Your doctor may have recommended a follow-up visit in the next 8 to 12 hours. If you are not getting better, you may need more tests or treatment. Even though your doctor has released you, you still need to watch for any problems. The doctor carefully checked you, but sometimes problems can develop later. If you have new symptoms or if your symptoms do not get better, get medical care right away. If you have worse or different chest pain or pressure that lasts more than 5 minutes or you passed out (lost consciousness), call 911 or seek other emergency help right away. A medical visit is only one step in your treatment. Even if you feel better, you still need to do what your doctor recommends, such as going to all suggested follow-up appointments and taking medicines exactly as directed. This will help you recover and help prevent future problems. How can you care for yourself at home? · Rest until you feel better. · Take your medicine exactly as prescribed. Call your doctor if you think you are having a problem with your medicine. · Do not drive after taking a prescription pain medicine. When should you call for help? Call 911 if:  ? · You passed out (lost consciousness). ? · You have severe difficulty breathing. ? · You have symptoms of a heart attack. These may include:  ¨ Chest pain or pressure, or a strange feeling in your chest.  ¨ Sweating. ¨ Shortness of breath. ¨ Nausea or vomiting.   ¨ Pain, pressure, or a strange feeling in your back, neck, jaw, or upper belly or in one or both shoulders or arms. ¨ Lightheadedness or sudden weakness. ¨ A fast or irregular heartbeat. After you call 911, the  may tell you to chew 1 adult-strength or 2 to 4 low-dose aspirin. Wait for an ambulance. Do not try to drive yourself. ?Call your doctor today if:  ? · You have any trouble breathing. ? · Your chest pain gets worse. ? · You are dizzy or lightheaded, or you feel like you may faint. ? · You are not getting better as expected. ? · You are having new or different chest pain. Where can you learn more? Go to http://jyoti-david.info/. Enter A120 in the search box to learn more about \"Chest Pain: Care Instructions. \"  Current as of: March 20, 2017  Content Version: 11.4  © 5747-1674 [employer]. Care instructions adapted under license by [employer] (which disclaims liability or warranty for this information). If you have questions about a medical condition or this instruction, always ask your healthcare professional. [address] any warranty or liability for your use of this information.

## 2018-03-23 NOTE — PROGRESS NOTES
Am assessment completed. Pt is alert and oriented x 3, lungs clear but diminished. Verbalizes needs well. Dry cough. Pt very independent. Ambulates in room. Is aware that she will have to do a 24 hr urine if dischared today. Continue to monitor. Denies pain.

## 2018-03-23 NOTE — PROGRESS NOTES
PM Assessment - Patient is alert and oriented x4. Ambulates independently in room and bathroom. Respirations even and clear. Bowel sounds active x4. No complaint of pain at this time. Currently resting in recliner with call light within reach.

## 2018-03-24 LAB
COMMENT, 006485: ABNORMAL
T GONDII IGG SERPL IA-ACNC: 22.3 IU/ML (ref 0–7.1)
T GONDII IGM SER IA-ACNC: <3 AU/ML (ref 0–7.9)

## 2018-03-25 LAB
BACTERIA SPEC CULT: NORMAL
BACTERIA SPEC CULT: NORMAL
SERVICE CMNT-IMP: NORMAL
SERVICE CMNT-IMP: NORMAL

## 2018-03-26 LAB
BACKGROUND, BCR6T: NORMAL
INTERPRETATION: 480488: NORMAL
LAB DIRECTOR NAME PROVIDER: NORMAL
SERIAL MONITORING, G80023: NORMAL
T(ABL1,BCR)B2A2/CONTROL BLD/T: NORMAL %
T(ABL1,BCR)B3A2/CONTROL BLD/T: NORMAL %
T(ABL1,BCR)E1A2/CONTROL BLD/T: NORMAL %

## 2018-03-27 LAB
ANNOTATION COMMENT IMP: NORMAL
M TB IFN-G CD4+ BCKGRND COR BLD-ACNC: <0 IU/ML
M TB IFN-G CD4+ T-CELLS BLD-ACNC: 0.05 IU/ML
M TB TUBERC IGNF/MITOGEN IGNF CONTROL: 2.24 IU/ML
QUANTIFERON INCUBATION: NORMAL
QUANTIFERON NIL VALUE: 0.07 IU/ML
QUANTIFERON TB GOLD, 182875: NEGATIVE
SERVICE CMNT-IMP: NORMAL

## 2018-03-28 ENCOUNTER — HOSPITAL ENCOUNTER (OUTPATIENT)
Dept: LAB | Age: 73
Discharge: HOME | End: 2018-03-28
Payer: MEDICARE

## 2018-03-28 DIAGNOSIS — R16.1 SPLENOMEGALY: ICD-10-CM

## 2018-03-28 PROBLEM — D69.6 THROMBOCYTOPENIA (HCC): Status: ACTIVE | Noted: 2018-03-28

## 2018-03-28 PROBLEM — D72.820 MONOCLONAL B-CELL LYMPHOCYTOSIS: Status: ACTIVE | Noted: 2018-03-28

## 2018-03-28 LAB
APTT PPP: 28.3 SEC (ref 23.2–35.3)
INR PPP: 1
PROTHROMBIN TIME: 13.1 SEC (ref 11.5–14.5)

## 2018-03-28 PROCEDURE — 86335 IMMUNFIX E-PHORSIS/URINE/CSF: CPT | Performed by: NURSE PRACTITIONER

## 2018-03-28 PROCEDURE — 36415 COLL VENOUS BLD VENIPUNCTURE: CPT | Performed by: INTERNAL MEDICINE

## 2018-03-28 PROCEDURE — 84156 ASSAY OF PROTEIN URINE: CPT | Performed by: NURSE PRACTITIONER

## 2018-03-28 PROCEDURE — 85730 THROMBOPLASTIN TIME PARTIAL: CPT | Performed by: INTERNAL MEDICINE

## 2018-03-28 PROCEDURE — 85610 PROTHROMBIN TIME: CPT | Performed by: INTERNAL MEDICINE

## 2018-03-30 LAB
ALBUMIN UR ELPH-MCNC: 2 MG/DL
ALBUMIN/GLOB SERPL: 0.3 {RATIO}
ALPHA1 GLOB 24H UR ELPH-MCNC: 0.3 MG/DL
ALPHA2 GLOB SERPL ELPH-MCNC: 1.4 MG/DL
B-GLOBULIN UR QL ELPH: 2.6 MG/DL
COLLECT DURATION TIME UR: 24 HR
GAMMA GLOB MFR UR ELPH: 1.7 MG/DL
M PROTEIN UR-MCNC: NORMAL MG/DL
PROT 24H UR-MRATE: 112 MG/24HR
PROT PATTERN SPEC IFE-IMP: NORMAL
PROT PATTERN UR ELPH-IMP: NORMAL
PROT UR-MCNC: 8 MG/DL
SPECIMEN VOL ?TM UR: 1400 ML

## 2018-04-03 ENCOUNTER — HOSPITAL ENCOUNTER (OUTPATIENT)
Dept: CT IMAGING | Age: 73
Discharge: HOME | End: 2018-04-03
Attending: INTERNAL MEDICINE

## 2018-04-03 ENCOUNTER — HOSPITAL ENCOUNTER (OUTPATIENT)
Dept: CT IMAGING | Age: 73
Discharge: HOME | End: 2018-04-03
Attending: INTERNAL MEDICINE
Payer: MEDICARE

## 2018-04-03 VITALS
HEART RATE: 68 BPM | BODY MASS INDEX: 42.52 KG/M2 | WEIGHT: 240 LBS | SYSTOLIC BLOOD PRESSURE: 141 MMHG | TEMPERATURE: 98.5 F | OXYGEN SATURATION: 96 % | RESPIRATION RATE: 18 BRPM | DIASTOLIC BLOOD PRESSURE: 78 MMHG | HEIGHT: 63 IN

## 2018-04-03 DIAGNOSIS — R16.1 SPLENOMEGALY: ICD-10-CM

## 2018-04-03 LAB
BASOPHILS # BLD: 0 K/UL (ref 0–0.2)
BASOPHILS NFR BLD: 1 % (ref 0–2)
BONE MARROW PREP & W,BMA: NORMAL
DIFFERENTIAL METHOD BLD: ABNORMAL
EOSINOPHIL # BLD: 0.2 K/UL (ref 0–0.8)
EOSINOPHIL NFR BLD: 4 % (ref 0.5–7.8)
ERYTHROCYTE [DISTWIDTH] IN BLOOD BY AUTOMATED COUNT: 13.9 % (ref 11.9–14.6)
HCT VFR BLD AUTO: 38.2 % (ref 35.8–46.3)
HGB BLD-MCNC: 12.6 G/DL (ref 11.7–15.4)
IMM GRANULOCYTES # BLD: 0 K/UL (ref 0–0.5)
IMM GRANULOCYTES NFR BLD AUTO: 1 % (ref 0–5)
LYMPHOCYTES # BLD: 0.8 K/UL (ref 0.5–4.6)
LYMPHOCYTES NFR BLD: 19 % (ref 13–44)
Lab: NORMAL
MCH RBC QN AUTO: 27 PG (ref 26.1–32.9)
MCHC RBC AUTO-ENTMCNC: 33 G/DL (ref 31.4–35)
MCV RBC AUTO: 82 FL (ref 79.6–97.8)
MONOCYTES # BLD: 0.3 K/UL (ref 0.1–1.3)
MONOCYTES NFR BLD: 7 % (ref 4–12)
NEUTS SEG # BLD: 3.1 K/UL (ref 1.7–8.2)
NEUTS SEG NFR BLD: 68 % (ref 43–78)
PLATELET # BLD AUTO: 124 K/UL (ref 150–450)
PMV BLD AUTO: 9.7 FL (ref 10.8–14.1)
RBC # BLD AUTO: 4.66 M/UL (ref 4.05–5.25)
TEST DESCRIPTION:,ATST: NORMAL
WBC # BLD AUTO: 4.4 K/UL (ref 4.3–11.1)

## 2018-04-03 PROCEDURE — 88313 SPECIAL STAINS GROUP 2: CPT | Performed by: INTERNAL MEDICINE

## 2018-04-03 PROCEDURE — 88342 IMHCHEM/IMCYTCHM 1ST ANTB: CPT

## 2018-04-03 PROCEDURE — 88312 SPECIAL STAINS GROUP 1: CPT | Performed by: INTERNAL MEDICINE

## 2018-04-03 PROCEDURE — 88280 CHROMOSOME KARYOTYPE STUDY: CPT

## 2018-04-03 PROCEDURE — 88184 FLOWCYTOMETRY/ TC 1 MARKER: CPT

## 2018-04-03 PROCEDURE — 88323 CONSLTJ&REPRT MATRL PREP SLD: CPT

## 2018-04-03 PROCEDURE — 36415 COLL VENOUS BLD VENIPUNCTURE: CPT | Performed by: INTERNAL MEDICINE

## 2018-04-03 PROCEDURE — 88237 TISSUE CULTURE BONE MARROW: CPT

## 2018-04-03 PROCEDURE — 85025 COMPLETE CBC W/AUTO DIFF WBC: CPT | Performed by: INTERNAL MEDICINE

## 2018-04-03 PROCEDURE — 88377 M/PHMTRC ALYS ISHQUANT/SEMIQ: CPT

## 2018-04-03 PROCEDURE — 88305 TISSUE EXAM BY PATHOLOGIST: CPT | Performed by: INTERNAL MEDICINE

## 2018-04-03 PROCEDURE — 88313 SPECIAL STAINS GROUP 2: CPT

## 2018-04-03 PROCEDURE — 88341 IMHCHEM/IMCYTCHM EA ADD ANTB: CPT

## 2018-04-03 PROCEDURE — 88311 DECALCIFY TISSUE: CPT | Performed by: INTERNAL MEDICINE

## 2018-04-03 PROCEDURE — 88264 CHROMOSOME ANALYSIS 20-25: CPT

## 2018-04-03 PROCEDURE — 74011000250 HC RX REV CODE- 250: Performed by: PHYSICIAN ASSISTANT

## 2018-04-03 PROCEDURE — 77012 CT SCAN FOR NEEDLE BIOPSY: CPT

## 2018-04-03 PROCEDURE — 88185 FLOWCYTOMETRY/TC ADD-ON: CPT

## 2018-04-03 RX ORDER — LIDOCAINE HYDROCHLORIDE 10 MG/ML
2-20 INJECTION, SOLUTION EPIDURAL; INFILTRATION; INTRACAUDAL; PERINEURAL
Status: DISCONTINUED | OUTPATIENT
Start: 2018-04-03 | End: 2018-04-07 | Stop reason: HOSPADM

## 2018-04-03 RX ADMIN — SODIUM BICARBONATE 2 ML: 0.2 INJECTION, SOLUTION INTRAVENOUS at 12:49

## 2018-04-03 RX ADMIN — LIDOCAINE HYDROCHLORIDE 10 ML: 10 INJECTION, SOLUTION EPIDURAL; INFILTRATION; INTRACAUDAL at 12:49

## 2018-04-03 NOTE — PROCEDURES
Department of Interventional Radiology  [phone]        Interventional Radiology Brief Procedure Note    Patient: Berlin Velarde MRN: [id number]  SSN: [id number]    YOB: 1945  Age: 67 y.o.   Sex: female      Date of Procedure: 4/3/2018    Pre-Procedure Diagnosis: splenomegaly    Post-Procedure Diagnosis: SAME    Procedure(s): Image Guided Biopsy    Brief Description of Procedure: CT guided right iliac bone marrow aspiration and core biopsy    Performed By: Shi Beltran PA-C     Assistants: None    Anesthesia:Lidocaine    Estimated Blood Loss: Less than 10ml    Specimens: core and aspirate    Implants: None    Findings: no post bx bleeding    Complications: None    Recommendations: bedrest    Follow Up: referring MD    Signed By: Shi Beltran PA-C     April 3, 2018

## 2018-04-03 NOTE — IP AVS SNAPSHOT
[address] 
[phone] Patient: Randy Yu MRN: [id number] ZAX:2/35/5097 About your hospitalization You were admitted on:  April 3, 2018 You last received care in the:  D RADIOLOGY CT SCAN You were discharged on:  April 3, 2018 Why you were hospitalized Your primary diagnosis was:  Not on File Follow-up Information Follow up With Details Comments Contact Info Chuck Arcos, [address] [address] Berlin Lu [phone] Your Scheduled Appointments Thursday April 05, 2018  8:30 AM EDT  
NM PET/CT DOSE with [facility] NM PET/CT INJ RM  
[facility] PET [facility]) C/ Kian Quinn [address] [place] [id number]  
[phone] * FOR ALL APPOINTMENTS BEFORE NOON: Nothing to eat or drink after midnight except for water ONLY. * AFTERNOON APPOINTMENTS: May eat a light breakfast, but without carbohydrates or sugars (We have a list of suggested foods). They must be NPO except for water for at least 4 hours before their appointment time. Patient should be well hydrated (at least 24 oz of water). Do not participate in strenuous activity the day before or the morning before afternoon appointments. Diabetic patients should refrain from insulin 4 hours prior to their appointment. No pregnant patients may have a PET/CT exam, breast feeding mothers should pump enough breast milk for 24 hours as they will not be able to breast feed for 1 day after the scan. Contact Nuclear Medicine with any questions. Procedure takes anywhere from 2-3 hours. If the patient requires pain or anxiety medications, arrangements must be made prior to patient's arrival with their ordering physician. The patient should wait 8 weeks after radiation therapy and 2 weeks after chemotherapy has been completed.   * PATIENT ARRIVAL Please report 30 minutes early to check in, except for 7 am patient 7am arrival.  
  
    
 Friday April 06, 2018  9:45 AM EDT Follow Up with Rayshawn Lugo MD  
[facility] Hematology and Oncology [facility]) COSME/ Kian Quinn [address] [place] [address]  
[phone] Thursday April 26, 2018 10:15 AM EDT  
CARDIOLITE with [address] OFFICE ([address] [facility]) [address] [place] [address]  
[phone] 1. Please DO NOT take any medication the morning of your procedure unless instructed to do so by your physician. PLEASE BRING ALL OF YOUR MEDICINE WITH YOU IN THE ORIGINAL BOTTLES. 2. Please DO NOT EAT OR DRINK ANYTHING PAST MIDNIGHT the night before your test - NOT EVEN A SIP OF WATER!  3. AVOID ANY FOOD AND DRINKS WITH CAFFEINE FOR AT LEAST 24 HOURS BEFORE YOUR TEST, even if it states 'caffeine-free or decaffeinated' - THIS INCLUDES ALL CHOCOLATE! 4. Wear comfortable, warm, loose fitting clothes as well as comfortable shoes. No metal buttons or snaps on the chest area. Please bring a sweater as it is cool in the testing area. 5. You must be able to recline with your arms above your head for 15 minutes for two sets of pictures. If you are not able to do so, please inform our staff prior to your procedure. 6. If you need to cancel your appointment, please call our office at [phone] [address]) or  [phone] Jairo Contreras) AT LEAST [address]. to your appointment. Should you fail to contact the office, you may be charged for material ordered for your procedure. 7. If you do not have insurance, you should immediately contact our patient  at [phone] in order to make arrangements for the payment of this service. If you are scheduled for a 2-Day Study, please be prepared to stay at least 2 1/2 hours each day.  If your study is ordered for 1-Day, please be prepared to stay for up to 5 hours to complete the study. Bring reading material to help pass the time. Please do not bring any valuables. You must remain fasting until you are completely finished with your procedure. **PLEASE ARRIVE 15 MINUTES EARLY FOR YOUR APPOINTMENT.**  
  
    
 Friday April 27, 2018 10:15 AM EDT  
CARDIOLITE with [address] OFFICE ([address] [place] [address]) [address] [place] [address] [facility]  
[phone] 1. Please DO NOT take any medication the morning of your procedure unless instructed to do so by your physician. PLEASE BRING ALL OF YOUR MEDICINE WITH YOU IN THE ORIGINAL BOTTLES. 2. Please DO NOT EAT OR DRINK ANYTHING PAST MIDNIGHT the night before your test - NOT EVEN A SIP OF WATER!  3. AVOID ANY FOOD AND DRINKS WITH CAFFEINE FOR AT LEAST 24 HOURS BEFORE YOUR TEST, even if it states 'caffeine-free or decaffeinated' - THIS INCLUDES ALL CHOCOLATE! 4. Wear comfortable, warm, loose fitting clothes as well as comfortable shoes. No metal buttons or snaps on the chest area. Please bring a sweater as it is cool in the testing area. 5. You must be able to recline with your arms above your head for 15 minutes for two sets of pictures. If you are not able to do so, please inform our staff prior to your procedure. 6. If you need to cancel your appointment, please call our office at [phone] [address]) or  [phone] Severa Bulla) AT LEAST [address] to your appointment. Should you fail to contact the office, you may be charged for material ordered for your procedure. 7. If you do not have insurance, you should immediately contact our patient  at [phone] in order to make arrangements for the payment of this service. If you are scheduled for a 2-Day Study, please be prepared to stay at least 2 1/2 hours each day.  If your study is ordered for 1-Day, please be prepared to stay for up to 5 hours to complete the study. Bring reading material to help pass the time. Please do not bring any valuables. You must remain fasting until you are completely finished with your procedure. **PLEASE ARRIVE 15 MINUTES EARLY FOR YOUR APPOINTMENT.**  
  
    
  
Discharge Orders None A check saran indicates which time of day the medication should be taken. My Medications ASK your doctor about these medications Instructions Each Dose to Equal  
 Morning Noon Evening Bedtime  
 raNITIdine 150 mg tablet Commonly known as:  ZANTAC Your last dose was: Your next dose is: Take 1 Tab by mouth two (2) times a day. 150 mg Discharge Instructions [address] Department of Interventional Radiology [address] [facility] 
[phone] Office 
[phone] Fax BIOPSY DISCHARGE INSTRUCTIONS General Instructions: A biopsy is the removal of a small piece of tissue for microscopic examination or testing. Healthy tissue can be obtained for the purpose of tissue-type matching for transplants. Unhealthy tissues are more commonly biopsied to diagnose disease. General Biopsy: 
   A mass can grow in any area of the body, and we are taking a specimen as ordered by your doctor. The risks are the same. They include bleeding, pain, and infection. Home Care Instructions: You may resume your regular diet and medication regimen. Do not drink alcohol, drive, or make any important legal decisions in the next 24 hours. Do not lift anything heavier than a gallon of milk until the soreness goes away. You may use over the counter acetaminophen or ibuprofen for the soreness. You may apply an ice pack to the affected area for 20-30 minutes at time for the first 24 hours. After that, you may apply a heat pack. Call If: You should call your Physician and/or the Radiology Nurse if you have any questions or concerns about the biopsy site. Call if you should have increased pain, fever, redness, drainage, or bleeding more than a small spot on the bandage. Follow-Up Instructions: Please see your ordering doctor as he/she has requested. To Reach Us: If you have any questions about your procedure, please call the Interventional Radiology department at [phone]. After business hours (5pm) and weekends, call the answering service at [phone] and ask for the Radiologist on call to be paged. Interventional Radiology General Nurse Discharge After general anesthesia or intravenous sedation, for 24 hours or while taking prescription Narcotics: · Limit your activities · Do not drive and operate hazardous machinery · Do not make important personal or business decisions · Do  not drink alcoholic beverages · If you have not urinated within 8 hours after discharge, please contact your surgeon on call. * Please give a list of your current medications to your Primary Care Provider. * Please update this list whenever your medications are discontinued, doses are 
   changed, or new medications (including over-the-counter products) are added. * Please carry medication information at all times in case of emergency situations. These are general instructions for a healthy lifestyle: No smoking/ No tobacco products/ Avoid exposure to second hand smoke Surgeon General's Warning:  Quitting smoking now greatly reduces serious risk to your health. Obesity, smoking, and sedentary lifestyle greatly increases your risk for illness A healthy diet, regular physical exercise & weight monitoring are important for maintaining a healthy lifestyle You may be retaining fluid if you have a history of heart failure or if you experience any of the following symptoms:  Weight gain of 3 pounds or more overnight or 5 pounds in a week, increased swelling in our hands or feet or shortness of breath while lying flat in bed. Please call your doctor as soon as you notice any of these symptoms; do not wait until your next office visit. Recognize signs and symptoms of STROKE: 
F-face looks uneven A-arms unable to move or move unevenly S-speech slurred or non-existent T-time-call 911 as soon as signs and symptoms begin-DO NOT go Back to bed or wait to see if you get better-TIME IS BRAIN. Patient Signature: 
Date: 4/3/2018 Discharging Nurse: Chasity Whipple RN Introducing \[facility] [place] [facility]\"" & HEALTH SERVICES! [facility] introduces Pipewise patient portal. Now you can access parts of your medical record, email your doctor's office, and request medication refills online. 1. In your internet browser, go to [email or web address] 2. Click on the First Time User? Click Here link in the Sign In box. You will see the New Member Sign Up page. 3. Enter your Pipewise Access Code exactly as it appears below. You will not need to use this code after youve completed the sign-up process. If you do not sign up before the expiration date, you must request a new code. · Pipewise Access Code: [email or web address] Expires: 6/19/2018  9:26 AM 
 
4. Enter the last four digits of your Social Security Number (xxxx) and Date of Birth (mm/dd/yyyy) as indicated and click Submit. You will be taken to the next sign-up page. 5. Create a Foundry Hiringt ID. This will be your Pipewise login ID and cannot be changed, so think of one that is secure and easy to remember. 6. Create a Pipewise password. You can change your password at any time. 7. Enter your Password Reset Question and Answer. This can be used at a later time if you forget your password. 8. Enter your e-mail address. You will receive e-mail notification when new information is available in [address] 9. Click Sign Up. You can now view and download portions of your medical record. 10. Click the Download Summary menu link to download a portable copy of your medical information. If you have questions, please visit the Frequently Asked Questions section of the gate5t website. Remember, ColorChip is NOT to be used for urgent needs. For medical emergencies, dial 911. Now available from your iPhone and Android! Introducing Bryan Romeo As a [facility] patient, I wanted to make you aware of our electronic visit tool called Bryan Romeo. Blueliv 24/7 allows you to connect within minutes with a medical provider 24 hours a day, seven days a week via a mobile device or tablet or logging into a secure website from your computer. You can access Bryan Romeo from anywhere in the [place]. A virtual visit might be right for you when you have a simple condition and feel like you just dont want to get out of bed, or cant get away from work for an appointment, when your regular [facility] provider is not available (evenings, weekends or holidays), or when youre out of town and need minor care. Electronic visits cost only $49 and if the Blueliv 24/7 provider determines a prescription is needed to treat your condition, one can be electronically transmitted to a nearby pharmacy*. Please take a moment to enroll today if you have not already done so. The enrollment process is free and takes just a few minutes. To enroll, please download the 24PageBooks/WorldOne howard to your tablet or phone, or visit [email or web address] to enroll on your computer. And, as an [address] [place] [address] patient with a ONEPLE account, the results of your visits will be scanned into your electronic medical record and your primary care provider will be able to view the scanned results.    
We urge you to continue to see your regular [facility] provider for your ongoing medical care. And while your primary care provider may not be the one available when you seek a Bryan Suarezterrellfin virtual visit, the peace of mind you get from getting a real diagnosis real time can be priceless. For more information on Bryan Suarezterrellfin, view our Frequently Asked Questions (FAQs) at [email or web address]. Sincerely, 
 
Deborah Singleton MD 
Chief Medical Officer Ronnie Cortez *:  certain medications cannot be prescribed via Bryan Suarezterrellluis Unresulted Labs-Please follow up with your PCP about these lab tests Order Current Status CT BX BONE MARROW AND ASPIRATION In process Providers Seen During Your Hospitalization Provider Specialty Primary office phone Alejandra Ramsey MD Hematology and Oncology [phone] Your Primary Care Physician (PCP) Primary Care Physician Office Phone Office Fax Paige Pereyra [phone] You are allergic to the following Allergen Reactions Augmentin (Amoxicillin-Pot Clavulanate) Nausea and Vomiting Recent Documentation Height Weight Breastfeeding? BMI OB Status Smoking Status 1.6 m 108.9 kg No 42.51 kg/m2 Hysterectomy Former Smoker Emergency Contacts Name Discharge Info Relation Home Work Mobile Suleiman Dhillon  Spouse [3] [phone] Patient Belongings The following personal items are in your possession at time of discharge: 
     Visual Aid: Glasses, None Please provide this summary of care documentation to your next provider. Signatures-by signing, you are acknowledging that this After Visit Summary has been reviewed with you and you have received a copy. Patient Signature:  ____________________________________________________________ Date:  ____________________________________________________________  
  
[facility]  Provider Signature: ____________________________________________________________ Date:  ____________________________________________________________

## 2018-04-03 NOTE — PROGRESS NOTES
Recovery period without difficulty. Pt alert and oriented and denies pain. Dressing is clean, dry, and intact. Reviewed discharge instructions with patient and , both verbalized understanding. Pt escorted to lobby discharge area via wheelchair.

## 2018-04-03 NOTE — IP AVS SNAPSHOT
Chao Villavicencio 
 
 
 [address] [facility] [address] [facility] 
[phone] Patient: Garey Peabody MRN: [id number] MPR:[birth year] A check saran indicates which time of day the medication should be taken. My Medications ASK your doctor about these medications Instructions Each Dose to Equal  
 Morning Noon Evening Bedtime  
 raNITIdine 150 mg tablet Commonly known as:  ZANTAC Your last dose was: Your next dose is: Take 1 Tab by mouth two (2) times a day.   
 150 mg

## 2018-04-03 NOTE — DISCHARGE INSTRUCTIONS
[place] [address]  Department of Interventional Radiology  [address] [place] [address] [address] [facility]  [phone] Office  [phone] Fax    BIOPSY DISCHARGE INSTRUCTIONS    General Instructions:     A biopsy is the removal of a small piece of tissue for microscopic examination or testing. Healthy tissue can be obtained for the purpose of tissue-type matching for transplants. Unhealthy tissues are more commonly biopsied to diagnose disease. General Biopsy:     A mass can grow in any area of the body, and we are taking a specimen as ordered by your doctor. The risks are the same. They include bleeding, pain, and infection. Home Care Instructions: You may resume your regular diet and medication regimen. Do not drink alcohol, drive, or make any important legal decisions in the next 24 hours. Do not lift anything heavier than a gallon of milk until the soreness goes away. You may use over the counter acetaminophen or ibuprofen for the soreness. You may apply an ice pack to the affected area for 20-30 minutes at time for the first 24 hours. After that, you may apply a heat pack. Call If: You should call your Physician and/or the Radiology Nurse if you have any questions or concerns about the biopsy site. Call if you should have increased pain, fever, redness, drainage, or bleeding more than a small spot on the bandage. Follow-Up Instructions: Please see your ordering doctor as he/she has requested. To Reach Us: If you have any questions about your procedure, please call the Interventional Radiology department at [phone]. After business hours (5pm) and weekends, call the answering service at [phone] and ask for the Radiologist on call to be paged.      Interventional Radiology General Nurse Discharge    After general anesthesia or intravenous sedation, for 24 hours or while taking prescription Narcotics:  · Limit your activities  · Do not drive and operate hazardous machinery  · Do not make important personal or business decisions  · Do  not drink alcoholic beverages  · If you have not urinated within 8 hours after discharge, please contact your surgeon on call. * Please give a list of your current medications to your Primary Care Provider. * Please update this list whenever your medications are discontinued, doses are     changed, or new medications (including over-the-counter products) are added. * Please carry medication information at all times in case of emergency situations. These are general instructions for a healthy lifestyle:    No smoking/ No tobacco products/ Avoid exposure to second hand smoke  Surgeon General's Warning:  Quitting smoking now greatly reduces serious risk to your health. Obesity, smoking, and sedentary lifestyle greatly increases your risk for illness  A healthy diet, regular physical exercise & weight monitoring are important for maintaining a healthy lifestyle    You may be retaining fluid if you have a history of heart failure or if you experience any of the following symptoms:  Weight gain of 3 pounds or more overnight or 5 pounds in a week, increased swelling in our hands or feet or shortness of breath while lying flat in bed. Please call your doctor as soon as you notice any of these symptoms; do not wait until your next office visit. Recognize signs and symptoms of STROKE:  F-face looks uneven    A-arms unable to move or move unevenly    S-speech slurred or non-existent    T-time-call 911 as soon as signs and symptoms begin-DO NOT go       Back to bed or wait to see if you get better-TIME IS BRAIN.         Patient Signature:  Date: 4/3/2018  Discharging Nurse: Livier Mosher RN

## 2018-04-12 ENCOUNTER — HOSPITAL ENCOUNTER (OUTPATIENT)
Dept: PET IMAGING | Age: 73
Discharge: HOME | End: 2018-04-12
Payer: MEDICARE

## 2018-04-12 DIAGNOSIS — R16.1 SPLENOMEGALY: ICD-10-CM

## 2018-04-12 PROCEDURE — A9552 F18 FDG: HCPCS

## 2018-04-12 PROCEDURE — 74011636320 HC RX REV CODE- 636/320: Performed by: INTERNAL MEDICINE

## 2018-04-12 RX ADMIN — DIATRIZOATE MEGLUMINE AND DIATRIZOATE SODIUM 10 ML: 660; 100 LIQUID ORAL; RECTAL at 11:40

## 2018-04-13 ENCOUNTER — HOSPITAL ENCOUNTER (OUTPATIENT)
Dept: LAB | Age: 73
Discharge: HOME | End: 2018-04-13
Payer: MEDICARE

## 2018-04-13 DIAGNOSIS — D72.820 MONOCLONAL B-CELL LYMPHOCYTOSIS: ICD-10-CM

## 2018-04-13 LAB
B2 MICROGLOB SERPL-MCNC: 2.9 MG/L (ref 0.8–2.34)
LDH SERPL L TO P-CCNC: 180 U/L (ref 110–210)
REFERENCE LAB,REFLB: NORMAL
TEST DESCRIPTION:,ATST: NORMAL

## 2018-04-13 PROCEDURE — 36415 COLL VENOUS BLD VENIPUNCTURE: CPT | Performed by: INTERNAL MEDICINE

## 2018-04-13 PROCEDURE — 82232 ASSAY OF BETA-2 PROTEIN: CPT | Performed by: INTERNAL MEDICINE

## 2018-04-13 PROCEDURE — 83615 LACTATE (LD) (LDH) ENZYME: CPT | Performed by: INTERNAL MEDICINE

## 2018-04-24 PROBLEM — D72.820 MONOCLONAL B-CELL LYMPHOCYTOSIS: Status: RESOLVED | Noted: 2018-03-28 | Resolved: 2018-04-24

## 2018-04-24 PROBLEM — D47.Z9 LOW GRADE B CELL LYMPHOPROLIFERATIVE DISORDER (HCC): Status: ACTIVE | Noted: 2018-04-24

## 2018-05-23 ENCOUNTER — HOSPITAL ENCOUNTER (OUTPATIENT)
Dept: ULTRASOUND IMAGING | Age: 73
Discharge: HOME | End: 2018-05-23
Attending: INTERNAL MEDICINE
Payer: MEDICARE

## 2018-05-23 DIAGNOSIS — D47.Z9 LOW GRADE B CELL LYMPHOPROLIFERATIVE DISORDER (HCC): ICD-10-CM

## 2018-05-23 DIAGNOSIS — R16.1 SPLENOMEGALY: ICD-10-CM

## 2018-05-23 PROCEDURE — 76705 ECHO EXAM OF ABDOMEN: CPT

## 2018-05-25 ENCOUNTER — HOSPITAL ENCOUNTER (OUTPATIENT)
Dept: LAB | Age: 73
Discharge: HOME | End: 2018-05-25
Payer: MEDICARE

## 2018-05-25 DIAGNOSIS — D47.Z9 LOW GRADE B CELL LYMPHOPROLIFERATIVE DISORDER (HCC): ICD-10-CM

## 2018-05-25 PROBLEM — J18.9 CAP (COMMUNITY ACQUIRED PNEUMONIA): Status: RESOLVED | Noted: 2018-03-20 | Resolved: 2018-05-25

## 2018-05-25 PROBLEM — R07.9 CHEST PAIN: Status: RESOLVED | Noted: 2018-03-20 | Resolved: 2018-05-25

## 2018-05-25 LAB
ALBUMIN SERPL-MCNC: 3.6 G/DL (ref 3.2–4.6)
ALBUMIN/GLOB SERPL: 1.2 {RATIO} (ref 1.2–3.5)
ALP SERPL-CCNC: 76 U/L (ref 50–136)
ALT SERPL-CCNC: 28 U/L (ref 12–65)
ANION GAP SERPL CALC-SCNC: 5 MMOL/L (ref 7–16)
AST SERPL-CCNC: 28 U/L (ref 15–37)
BASOPHILS # BLD: 0 K/UL (ref 0–0.2)
BASOPHILS NFR BLD: 0 % (ref 0–2)
BILIRUB SERPL-MCNC: 0.3 MG/DL (ref 0.2–1.1)
BUN SERPL-MCNC: 14 MG/DL (ref 8–23)
CALCIUM SERPL-MCNC: 8.7 MG/DL (ref 8.3–10.4)
CHLORIDE SERPL-SCNC: 109 MMOL/L (ref 98–107)
CO2 SERPL-SCNC: 27 MMOL/L (ref 21–32)
CREAT SERPL-MCNC: 0.56 MG/DL (ref 0.6–1)
DIFFERENTIAL METHOD BLD: ABNORMAL
EOSINOPHIL # BLD: 0.2 K/UL (ref 0–0.8)
EOSINOPHIL NFR BLD: 3 % (ref 0.5–7.8)
ERYTHROCYTE [DISTWIDTH] IN BLOOD BY AUTOMATED COUNT: 14.3 % (ref 11.9–14.6)
GLOBULIN SER CALC-MCNC: 3 G/DL (ref 2.3–3.5)
GLUCOSE SERPL-MCNC: 138 MG/DL (ref 65–100)
HCT VFR BLD AUTO: 36.4 % (ref 35.8–46.3)
HGB BLD-MCNC: 11.8 G/DL (ref 11.7–15.4)
LDH SERPL L TO P-CCNC: 177 U/L (ref 110–210)
LYMPHOCYTES # BLD: 0.7 K/UL (ref 0.5–4.6)
LYMPHOCYTES NFR BLD: 16 % (ref 13–44)
MCH RBC QN AUTO: 26 PG (ref 26.1–32.9)
MCHC RBC AUTO-ENTMCNC: 32.4 G/DL (ref 31.4–35)
MCV RBC AUTO: 80.4 FL (ref 79.6–97.8)
MONOCYTES # BLD: 0.3 K/UL (ref 0.1–1.3)
MONOCYTES NFR BLD: 7 % (ref 4–12)
NEUTS SEG # BLD: 3.3 K/UL (ref 1.7–8.2)
NEUTS SEG NFR BLD: 73 % (ref 43–78)
NRBC # BLD: 0 K/UL (ref 0–0.2)
PLATELET # BLD AUTO: 132 K/UL (ref 150–450)
PMV BLD AUTO: 10.8 FL (ref 10.8–14.1)
POTASSIUM SERPL-SCNC: 4.3 MMOL/L (ref 3.5–5.1)
PROT SERPL-MCNC: 6.6 G/DL (ref 6.3–8.2)
RBC # BLD AUTO: 4.53 M/UL (ref 4.05–5.25)
SODIUM SERPL-SCNC: 141 MMOL/L (ref 136–145)
WBC # BLD AUTO: 4.5 K/UL (ref 4.3–11.1)

## 2018-05-25 PROCEDURE — 85025 COMPLETE CBC W/AUTO DIFF WBC: CPT | Performed by: INTERNAL MEDICINE

## 2018-05-25 PROCEDURE — 80053 COMPREHEN METABOLIC PANEL: CPT | Performed by: INTERNAL MEDICINE

## 2018-05-25 PROCEDURE — 83615 LACTATE (LD) (LDH) ENZYME: CPT | Performed by: INTERNAL MEDICINE

## 2018-05-25 PROCEDURE — 36415 COLL VENOUS BLD VENIPUNCTURE: CPT | Performed by: INTERNAL MEDICINE

## 2019-03-25 ENCOUNTER — APPOINTMENT (RX ONLY)
Dept: URBAN - NONMETROPOLITAN AREA CLINIC 1 | Facility: CLINIC | Age: 74
Setting detail: DERMATOLOGY
End: 2019-03-25

## 2019-03-25 DIAGNOSIS — L01.01 NON-BULLOUS IMPETIGO: ICD-10-CM

## 2019-03-25 DIAGNOSIS — L29.89 OTHER PRURITUS: ICD-10-CM

## 2019-03-25 DIAGNOSIS — L82.1 OTHER SEBORRHEIC KERATOSIS: ICD-10-CM

## 2019-03-25 DIAGNOSIS — L29.8 OTHER PRURITUS: ICD-10-CM

## 2019-03-25 DIAGNOSIS — D485 NEOPLASM OF UNCERTAIN BEHAVIOR OF SKIN: ICD-10-CM

## 2019-03-25 PROBLEM — D48.5 NEOPLASM OF UNCERTAIN BEHAVIOR OF SKIN: Status: ACTIVE | Noted: 2019-03-25

## 2019-03-25 PROCEDURE — 11102 TANGNTL BX SKIN SINGLE LES: CPT

## 2019-03-25 PROCEDURE — ? ORDER TESTS

## 2019-03-25 PROCEDURE — ? BIOPSY BY SHAVE METHOD

## 2019-03-25 PROCEDURE — 99203 OFFICE O/P NEW LOW 30 MIN: CPT | Mod: 25

## 2019-03-25 PROCEDURE — ? COUNSELING

## 2019-03-25 PROCEDURE — ? PRESCRIPTION

## 2019-03-25 RX ORDER — HYDROXYZINE HYDROCHLORIDE 10 MG/1
TABLET, FILM COATED ORAL
Qty: 30 | Refills: 0 | Status: ERX | COMMUNITY
Start: 2019-03-25

## 2019-03-25 RX ORDER — BETAMETHASONE DIPROPIONATE 0.5 MG/ML
LOTION TOPICAL
Qty: 1 | Refills: 1 | Status: ERX | COMMUNITY
Start: 2019-03-25

## 2019-03-25 RX ORDER — DOXYCYCLINE 100 MG/1
CAPSULE ORAL
Qty: 20 | Refills: 0 | Status: ERX | COMMUNITY
Start: 2019-03-25

## 2019-03-25 RX ADMIN — DOXYCYCLINE: 100 CAPSULE ORAL at 00:00

## 2019-03-25 RX ADMIN — HYDROXYZINE HYDROCHLORIDE: 10 TABLET, FILM COATED ORAL at 00:00

## 2019-03-25 RX ADMIN — BETAMETHASONE DIPROPIONATE: 0.5 LOTION TOPICAL at 00:00

## 2019-03-25 ASSESSMENT — LOCATION DETAILED DESCRIPTION DERM
LOCATION DETAILED: LEFT MEDIAL MALAR CHEEK
LOCATION DETAILED: LEFT OCCIPITAL SCALP
LOCATION DETAILED: LEFT DISTAL POSTERIOR UPPER ARM
LOCATION DETAILED: RIGHT INFERIOR OCCIPITAL SCALP
LOCATION DETAILED: LEFT INFERIOR OCCIPITAL SCALP
LOCATION DETAILED: MID-OCCIPITAL SCALP

## 2019-03-25 ASSESSMENT — LOCATION SIMPLE DESCRIPTION DERM
LOCATION SIMPLE: LEFT POSTERIOR UPPER ARM
LOCATION SIMPLE: LEFT CHEEK
LOCATION SIMPLE: POSTERIOR SCALP
LOCATION SIMPLE: SCALP

## 2019-03-25 ASSESSMENT — LOCATION ZONE DERM
LOCATION ZONE: FACE
LOCATION ZONE: ARM
LOCATION ZONE: SCALP

## 2019-03-25 NOTE — PROCEDURE: BIOPSY BY SHAVE METHOD
Additional Anesthesia Volume In Cc (Will Not Render If 0): 0
Size Of Lesion In Cm: 0.7
Cryotherapy Text: The wound bed was treated with cryotherapy after the biopsy was performed.
Electrodesiccation Text: The wound bed was treated with electrodesiccation after the biopsy was performed.
Render Post-Care Instructions In Note?: yes
Wound Care: Petrolatum
Lab Facility: 3
Anesthesia Type: 1% lidocaine with epinephrine
Biopsy Type: H and E
Bill 93139 For Specimen Handling/Conveyance To Laboratory?: no
Depth Of Biopsy: dermis
Billing Type: Third-Party Bill
Type Of Destruction Used: Curettage
Post-care instructions were given and reviewed in detail. Patient is to keep the biopsy site dry overnight, and then apply vaseline daily until healed. The patient was instructed to call with any questions or concerns.
Dressing: bandage
Electrodesiccation And Curettage Text: The wound bed was treated with electrodesiccation and curettage after the biopsy was performed.
Hemostasis: Aluminum Chloride
Silver Nitrate Text: The wound bed was treated with silver nitrate after the biopsy was performed.
Anesthesia Volume In Cc (Will Not Render If 0): 0.5
Curettage Text: The wound bed was treated with curettage after the biopsy was performed.
Notification Instructions: Patient will be notified of biopsy results. However, patient instructed to call the office if not contacted within 2 weeks.
Consent: Written consent was obtained with patient's permission and risks were reviewed per signed consent form. Biopsy was performed with patient's verbal permission.
Lab: -97
Biopsy Method: Personna blade
Detail Level: Detailed

## 2019-03-25 NOTE — HPI: ITCHING (SCALP)
How Did The Scalp Condition Occur?: sudden in onset (over a period of weeks to a few months)
How Severe Is The Scalp Condition?: moderate
Additional History: Weeping clear fluids

## 2019-04-12 RX ORDER — DOXYCYCLINE 100 MG/1
CAPSULE ORAL
Qty: 20 | Refills: 0 | Status: ERX

## 2019-04-17 ENCOUNTER — APPOINTMENT (RX ONLY)
Dept: URBAN - NONMETROPOLITAN AREA CLINIC 1 | Facility: CLINIC | Age: 74
Setting detail: DERMATOLOGY
End: 2019-04-17

## 2019-04-17 DIAGNOSIS — L01.01 NON-BULLOUS IMPETIGO: ICD-10-CM | Status: IMPROVED

## 2019-04-17 DIAGNOSIS — L30.9 DERMATITIS, UNSPECIFIED: ICD-10-CM

## 2019-04-17 PROBLEM — D04.39 CARCINOMA IN SITU OF SKIN OF OTHER PARTS OF FACE: Status: ACTIVE | Noted: 2019-04-17

## 2019-04-17 PROCEDURE — ? COUNSELING

## 2019-04-17 PROCEDURE — ? RECOMMENDATIONS

## 2019-04-17 PROCEDURE — 99213 OFFICE O/P EST LOW 20 MIN: CPT

## 2019-04-17 PROCEDURE — ? ADDITIONAL NOTES

## 2019-04-17 PROCEDURE — ? PRESCRIPTION SAMPLES PROVIDED

## 2019-04-17 PROCEDURE — ? TREATMENT REGIMEN

## 2019-04-17 ASSESSMENT — LOCATION DETAILED DESCRIPTION DERM
LOCATION DETAILED: MID-OCCIPITAL SCALP
LOCATION DETAILED: RIGHT NASAL SIDEWALL

## 2019-04-17 ASSESSMENT — LOCATION ZONE DERM
LOCATION ZONE: NOSE
LOCATION ZONE: SCALP

## 2019-04-17 ASSESSMENT — LOCATION SIMPLE DESCRIPTION DERM
LOCATION SIMPLE: RIGHT NOSE
LOCATION SIMPLE: POSTERIOR SCALP

## 2019-04-17 NOTE — PROCEDURE: PRESCRIPTION SAMPLES PROVIDED
Samples Given: Neosynalar 1-2x a day to area
Detail Level: Zone
Samples Given: Use trade sample of Neosynalar given

## 2019-04-17 NOTE — PROCEDURE: RECOMMENDATIONS
Recommendation Preamble: The following recommendations were made during the visit: schedule 30 minute excision with Luis
Detail Level: Detailed

## 2019-04-17 NOTE — PROCEDURE: COUNSELING
Detail Level: Simple
Detail Level: Detailed
Patient Specific Counseling (Will Not Stick From Patient To Patient): Discussed treatment options with patient. Topicals vs Ed&c vs excision. Patient prefers excision. Will schedule for excision with nivia

## 2019-04-17 NOTE — PROCEDURE: ADDITIONAL NOTES
Detail Level: Detailed
Additional Notes: JANETTE
Additional Notes: Avoid scratching or rubbing. Continue otc antihistamine

## 2019-05-08 ENCOUNTER — APPOINTMENT (RX ONLY)
Dept: URBAN - NONMETROPOLITAN AREA CLINIC 1 | Facility: CLINIC | Age: 74
Setting detail: DERMATOLOGY
End: 2019-05-08

## 2019-05-08 DIAGNOSIS — L01.01 NON-BULLOUS IMPETIGO: ICD-10-CM | Status: RESOLVED

## 2019-05-08 DIAGNOSIS — D485 NEOPLASM OF UNCERTAIN BEHAVIOR OF SKIN: ICD-10-CM

## 2019-05-08 DIAGNOSIS — L40.0 PSORIASIS VULGARIS: ICD-10-CM | Status: UNCHANGED

## 2019-05-08 PROBLEM — D04.39 CARCINOMA IN SITU OF SKIN OF OTHER PARTS OF FACE: Status: ACTIVE | Noted: 2019-05-08

## 2019-05-08 PROBLEM — D48.5 NEOPLASM OF UNCERTAIN BEHAVIOR OF SKIN: Status: ACTIVE | Noted: 2019-05-08

## 2019-05-08 PROCEDURE — 99214 OFFICE O/P EST MOD 30 MIN: CPT | Mod: 25

## 2019-05-08 PROCEDURE — ? BIOPSY BY SHAVE METHOD

## 2019-05-08 PROCEDURE — ? COUNSELING

## 2019-05-08 PROCEDURE — ? PRESCRIPTION SAMPLES PROVIDED

## 2019-05-08 PROCEDURE — ? MEDICATION COUNSELING

## 2019-05-08 PROCEDURE — ? EDUCATIONAL RESOURCES PROVIDED

## 2019-05-08 PROCEDURE — ? PRESCRIPTION

## 2019-05-08 PROCEDURE — 11103 TANGNTL BX SKIN EA SEP/ADDL: CPT

## 2019-05-08 PROCEDURE — 11102 TANGNTL BX SKIN SINGLE LES: CPT

## 2019-05-08 RX ORDER — IMIQUIMOD 50 MG/G
CREAM TOPICAL
Qty: 24 | Refills: 0 | Status: ERX | COMMUNITY
Start: 2019-05-08

## 2019-05-08 RX ORDER — FLUOCINOLONE ACETONIDE 0.11 MG/ML
OIL TOPICAL
Qty: 1 | Refills: 3 | Status: ERX | COMMUNITY
Start: 2019-05-08

## 2019-05-08 RX ADMIN — IMIQUIMOD: 50 CREAM TOPICAL at 00:00

## 2019-05-08 RX ADMIN — FLUOCINOLONE ACETONIDE: 0.11 OIL TOPICAL at 00:00

## 2019-05-08 ASSESSMENT — LOCATION ZONE DERM
LOCATION ZONE: SCALP
LOCATION ZONE: ARM
LOCATION ZONE: FACE

## 2019-05-08 ASSESSMENT — LOCATION DETAILED DESCRIPTION DERM
LOCATION DETAILED: LEFT INFERIOR OCCIPITAL SCALP
LOCATION DETAILED: RIGHT SUPERIOR NASAL CHEEK
LOCATION DETAILED: RIGHT OCCIPITAL SCALP
LOCATION DETAILED: RIGHT PROXIMAL RADIAL DORSAL FOREARM
LOCATION DETAILED: RIGHT INFERIOR POSTAURICULAR SKIN
LOCATION DETAILED: MID-OCCIPITAL SCALP

## 2019-05-08 ASSESSMENT — LOCATION SIMPLE DESCRIPTION DERM
LOCATION SIMPLE: RIGHT CHEEK
LOCATION SIMPLE: RIGHT FOREARM
LOCATION SIMPLE: POSTERIOR SCALP

## 2019-05-08 ASSESSMENT — BSA PSORIASIS: % BODY COVERED IN PSORIASIS: 5

## 2019-05-08 NOTE — PROCEDURE: MEDICATION COUNSELING
Acitretin Pregnancy And Lactation Text: This medication is Pregnancy Category X and should not be given to women who are pregnant or may become pregnant in the future. This medication is excreted in breast milk.
Ilumya Pregnancy And Lactation Text: The risk during pregnancy and breastfeeding is uncertain with this medication.
Drysol Counseling:  I discussed with the patient the risks of drysol/aluminum chloride including but not limited to skin rash, itching, irritation, burning.
Xolair Counseling:  Patient informed of potential adverse effects including but not limited to fever, muscle aches, rash and allergic reactions.  The patient verbalized understanding of the proper use and possible adverse effects of Xolair.  All of the patient's questions and concerns were addressed.
Albendazole Pregnancy And Lactation Text: This medication is Pregnancy Category C and it isn't known if it is safe during pregnancy. It is also excreted in breast milk.
Griseofulvin Counseling:  I discussed with the patient the risks of griseofulvin including but not limited to photosensitivity, cytopenia, liver damage, nausea/vomiting and severe allergy.  The patient understands that this medication is best absorbed when taken with a fatty meal (e.g., ice cream or french fries).
Mirvaso Pregnancy And Lactation Text: This medication has not been assigned a Pregnancy Risk Category. It is unknown if the medication is excreted in breast milk.
Otezla Pregnancy And Lactation Text: This medication is Pregnancy Category C and it isn't known if it is safe during pregnancy. It is unknown if it is excreted in breast milk.
Dapsone Pregnancy And Lactation Text: This medication is Pregnancy Category C and is not considered safe during pregnancy or breast feeding.
Topical Sulfur Applications Pregnancy And Lactation Text: This medication is Pregnancy Category C and has an unknown safety profile during pregnancy. It is unknown if this topical medication is excreted in breast milk.
Oxybutynin Counseling:  I discussed with the patient the risks of oxybutynin including but not limited to skin rash, drowsiness, dry mouth, difficulty urinating, and blurred vision.
Wartpeel Counseling:  I discussed with the patient the risks of Wartpeel including but not limited to erythema, scaling, itching, weeping, crusting, and pain.
Bexarotene Counseling:  I discussed with the patient the risks of bexarotene including but not limited to hair loss, dry lips/skin/eyes, liver abnormalities, hyperlipidemia, pancreatitis, depression/suicidal ideation, photosensitivity, drug rash/allergic reactions, hypothyroidism, anemia, leukopenia, infection, cataracts, and teratogenicity.  Patient understands that they will need regular blood tests to check lipid profile, liver function tests, white blood cell count, thyroid function tests and pregnancy test if applicable.
Drysol Pregnancy And Lactation Text: This medication is considered safe during pregnancy and breast feeding.
Ivermectin Counseling:  Patient instructed to take medication on an empty stomach with a full glass of water.  Patient informed of potential adverse effects including but not limited to nausea, diarrhea, dizziness, itching, and swelling of the extremities or lymph nodes.  The patient verbalized understanding of the proper use and possible adverse effects of ivermectin.  All of the patient's questions and concerns were addressed.
Infliximab Counseling:  I discussed with the patient the risks of infliximab including but not limited to myelosuppression, immunosuppression, autoimmune hepatitis, demyelinating diseases, lymphoma, and serious infections.  The patient understands that monitoring is required including a PPD at baseline and must alert us or the primary physician if symptoms of infection or other concerning signs are noted.
Xolair Pregnancy And Lactation Text: This medication is Pregnancy Category B and is considered safe during pregnancy. This medication is excreted in breast milk.
Griseofulvin Pregnancy And Lactation Text: This medication is Pregnancy Category X and is known to cause serious birth defects. It is unknown if this medication is excreted in breast milk but breast feeding should be avoided.
Erythromycin Pregnancy And Lactation Text: This medication is Pregnancy Category B and is considered safe during pregnancy. It is also excreted in breast milk.
Picato Counseling:  I discussed with the patient the risks of Picato including but not limited to erythema, scaling, itching, weeping, crusting, and pain.
Metronidazole Counseling:  I discussed with the patient the risks of metronidazole including but not limited to seizures, nausea/vomiting, a metallic taste in the mouth, nausea/vomiting and severe allergy.
Infliximab Pregnancy And Lactation Text: This medication is Pregnancy Category B and is considered safe during pregnancy. It is unknown if this medication is excreted in breast milk.
Erivedge Counseling- I discussed with the patient the risks of Erivedge including but not limited to nausea, vomiting, diarrhea, constipation, weight loss, changes in the sense of taste, decreased appetite, muscle spasms, and hair loss.  The patient verbalized understanding of the proper use and possible adverse effects of Erivedge.  All of the patient's questions and concerns were addressed.
Elidel Counseling: Patient may experience a mild burning sensation during topical application. Elidel is not approved in children less than 2 years of age. There have been case reports of hematologic and skin malignancies in patients using topical calcineurin inhibitors although causality is questionable.
Bexarotene Pregnancy And Lactation Text: This medication is Pregnancy Category X and should not be given to women who are pregnant or may become pregnant. This medication should not be used if you are breast feeding.
Itraconazole Counseling:  I discussed with the patient the risks of itraconazole including but not limited to liver damage, nausea/vomiting, neuropathy, and severe allergy.  The patient understands that this medication is best absorbed when taken with acidic beverages such as non-diet cola or ginger ale.  The patient understands that monitoring is required including baseline LFTs and repeat LFTs at intervals.  The patient understands that they are to contact us or the primary physician if concerning signs are noted.
Erivedge Pregnancy And Lactation Text: This medication is Pregnancy Category X and is absolutely contraindicated during pregnancy. It is unknown if it is excreted in breast milk.
Picato Pregnancy And Lactation Text: This medication is Pregnancy Category C. It is unknown if this medication is excreted in breast milk.
Metronidazole Pregnancy And Lactation Text: This medication is Pregnancy Category B and considered safe during pregnancy.  It is also excreted in breast milk.
Oxybutynin Pregnancy And Lactation Text: This medication is Pregnancy Category B and is considered safe during pregnancy. It is unknown if it is excreted in breast milk.
Wartpeel Pregnancy And Lactation Text: This medication is Pregnancy Category X and contraindicated in pregnancy and in women who may become pregnant. It is unknown if this medication is excreted in breast milk.
Isotretinoin Counseling: Patient should get monthly blood tests, not donate blood, not drive at night if vision affected, not share medication, and not undergo elective surgery for 6 months after tx completed. Side effects reviewed, pt to contact office should one occur.
Rituxan Counseling:  I discussed with the patient the risks of Rituxan infusions. Side effects can include infusion reactions, severe drug rashes including mucocutaneous reactions, reactivation of latent hepatitis and other infections and rarely progressive multifocal leukoencephalopathy.  All of the patient's questions and concerns were addressed.
Azathioprine Counseling:  I discussed with the patient the risks of azathioprine including but not limited to myelosuppression, immunosuppression, hepatotoxicity, lymphoma, and infections.  The patient understands that monitoring is required including baseline LFTs, Creatinine, possible TPMP genotyping and weekly CBCs for the first month and then every 2 weeks thereafter.  The patient verbalized understanding of the proper use and possible adverse effects of azathioprine.  All of the patient's questions and concerns were addressed.
Itraconazole Pregnancy And Lactation Text: This medication is Pregnancy Category C and it isn't know if it is safe during pregnancy. It is also excreted in breast milk.
Rituxan Pregnancy And Lactation Text: This medication is Pregnancy Category C and it isn't know if it is safe during pregnancy. It is unknown if this medication is excreted in breast milk but similar antibodies are known to be excreted.
Azathioprine Pregnancy And Lactation Text: This medication is Pregnancy Category D and isn't considered safe during pregnancy. It is unknown if this medication is excreted in breast milk.
Ketoconazole Counseling:   Patient counseled regarding improving absorption with orange juice.  Adverse effects include but are not limited to breast enlargement, headache, diarrhea, nausea, upset stomach, liver function test abnormalities, taste disturbance, and stomach pain.  There is a rare possibility of liver failure that can occur when taking ketoconazole. The patient understands that monitoring of LFTs may be required, especially at baseline. The patient verbalized understanding of the proper use and possible adverse effects of ketoconazole.  All of the patient's questions and concerns were addressed.
Birth Control Pills Counseling: Birth Control Pill Counseling: I discussed with the patient the potential side effects of OCPs including but not limited to increased risk of stroke, heart attack, thrombophlebitis, deep venous thrombosis, hepatic adenomas, breast changes, GI upset, headaches, and depression.  The patient verbalized understanding of the proper use and possible adverse effects of OCPs. All of the patient's questions and concerns were addressed.
Zyclara Counseling:  I discussed with the patient the risks of imiquimod including but not limited to erythema, scaling, itching, weeping, crusting, and pain.  Patient understands that the inflammatory response to imiquimod is variable from person to person and was educated regarded proper titration schedule.  If flu-like symptoms develop, patient knows to discontinue the medication and contact us.
Gabapentin Counseling: I discussed with the patient the risks of gabapentin including but not limited to dizziness, somnolence, fatigue and ataxia.
Protopic Counseling: Patient may experience a mild burning sensation during topical application. Protopic is not approved in children less than 2 years of age. There have been case reports of hematologic and skin malignancies in patients using topical calcineurin inhibitors although causality is questionable.
Isotretinoin Pregnancy And Lactation Text: This medication is Pregnancy Category X and is considered extremely dangerous during pregnancy. It is unknown if it is excreted in breast milk.
Eucrisa Counseling: Patient may experience a mild burning sensation during topical application. Eucrisa is not approved in children less than 2 years of age.
Gabapentin Pregnancy And Lactation Text: This medication is Pregnancy Category C and isn't considered safe during pregnancy. It is excreted in breast milk.
Protopic Pregnancy And Lactation Text: This medication is Pregnancy Category C. It is unknown if this medication is excreted in breast milk when applied topically.
Siliq Counseling:  I discussed with the patient the risks of Siliq including but not limited to new or worsening depression, suicidal thoughts and behavior, immunosuppression, malignancy, posterior leukoencephalopathy syndrome, and serious infections.  The patient understands that monitoring is required including a PPD at baseline and must alert us or the primary physician if symptoms of infection or other concerning signs are noted. There is also a special program designed to monitor depression which is required with Siliq.
Birth Control Pills Pregnancy And Lactation Text: This medication should be avoided if pregnant and for the first 30 days post-partum.
Cellcept Counseling:  I discussed with the patient the risks of mycophenolate mofetil including but not limited to infection/immunosuppression, GI upset, hypokalemia, hypercholesterolemia, bone marrow suppression, lymphoproliferative disorders, malignancy, GI ulceration/bleed/perforation, colitis, interstitial lung disease, kidney failure, progressive multifocal leukoencephalopathy, and birth defects.  The patient understands that monitoring is required including a baseline creatinine and regular CBC testing. In addition, patient must alert us immediately if symptoms of infection or other concerning signs are noted.
Eucrisa Pregnancy And Lactation Text: This medication has not been assigned a Pregnancy Risk Category but animal studies failed to show danger with the topical medication. It is unknown if the medication is excreted in breast milk.
High Dose Vitamin A Counseling: Side effects reviewed, pt to contact office should one occur.
Ketoconazole Pregnancy And Lactation Text: This medication is Pregnancy Category C and it isn't know if it is safe during pregnancy. It is also excreted in breast milk and breast feeding isn't recommended.
Minocycline Counseling: Patient advised regarding possible photosensitivity and discoloration of the teeth, skin, lips, tongue and gums.  Patient instructed to avoid sunlight, if possible.  When exposed to sunlight, patients should wear protective clothing, sunglasses, and sunscreen.  The patient was instructed to call the office immediately if the following severe adverse effects occur:  hearing changes, easy bruising/bleeding, severe headache, or vision changes.  The patient verbalized understanding of the proper use and possible adverse effects of minocycline.  All of the patient's questions and concerns were addressed.
Terbinafine Counseling: Patient counseling regarding adverse effects of terbinafine including but not limited to headache, diarrhea, rash, upset stomach, liver function test abnormalities, itching, taste/smell disturbance, nausea, abdominal pain, and flatulence.  There is a rare possibility of liver failure that can occur when taking terbinafine.  The patient understands that a baseline LFT and kidney function test may be required. The patient verbalized understanding of the proper use and possible adverse effects of terbinafine.  All of the patient's questions and concerns were addressed.
Glycopyrrolate Counseling:  I discussed with the patient the risks of glycopyrrolate including but not limited to skin rash, drowsiness, dry mouth, difficulty urinating, and blurred vision.
Spironolactone Counseling: Patient advised regarding risks of diarrhea, abdominal pain, hyperkalemia, birth defects (for female patients), liver toxicity and renal toxicity. The patient may need blood work to monitor liver and kidney function and potassium levels while on therapy. The patient verbalized understanding of the proper use and possible adverse effects of spironolactone.  All of the patient's questions and concerns were addressed.
Rhofade Counseling: Rhofade is a topical medication which can decrease superficial blood flow where applied. Side effects are uncommon and include stinging, redness and allergic reactions.
Cimzia Counseling:  I discussed with the patient the risks of Cimzia including but not limited to immunosuppression, allergic reactions and infections.  The patient understands that monitoring is required including a PPD at baseline and must alert us or the primary physician if symptoms of infection or other concerning signs are noted.
Cimzia Pregnancy And Lactation Text: This medication crosses the placenta but can be considered safe in certain situations. Cimzia may be excreted in breast milk.
Azithromycin Counseling:  I discussed with the patient the risks of azithromycin including but not limited to GI upset, allergic reaction, drug rash, diarrhea, and yeast infections.
High Dose Vitamin A Pregnancy And Lactation Text: High dose vitamin A therapy is contraindicated during pregnancy and breast feeding.
Opioid Pregnancy And Lactation Text: These medications can lead to premature delivery and should be avoided during pregnancy. These medications are also present in breast milk in small amounts.
Hydroquinone Counseling:  Patient advised that medication may result in skin irritation, lightening (hypopigmentation), dryness, and burning.  In the event of skin irritation, the patient was advised to reduce the amount of the drug applied or use it less frequently.  Rarely, spots that are treated with hydroquinone can become darker (pseudoochronosis).  Should this occur, patient instructed to stop medication and call the office. The patient verbalized understanding of the proper use and possible adverse effects of hydroquinone.  All of the patient's questions and concerns were addressed.
Detail Level: Simple
Spironolactone Pregnancy And Lactation Text: This medication can cause feminization of the male fetus and should be avoided during pregnancy. The active metabolite is also found in breast milk.
Opioid Counseling: I discussed with the patient the potential side effects of opioids including but not limited to addiction, altered mental status, and depression. I stressed avoiding alcohol, benzodiazepines, muscle relaxants and sleep aids unless specifically okayed by a physician. The patient verbalized understanding of the proper use and possible adverse effects of opioids. All of the patient's questions and concerns were addressed. They were instructed to flush the remaining pills down the toilet if they did not need them for pain.
Glycopyrrolate Pregnancy And Lactation Text: This medication is Pregnancy Category B and is considered safe during pregnancy. It is unknown if it is excreted breast milk.
Simponi Counseling:  I discussed with the patient the risks of golimumab including but not limited to myelosuppression, immunosuppression, autoimmune hepatitis, demyelinating diseases, lymphoma, and serious infections.  The patient understands that monitoring is required including a PPD at baseline and must alert us or the primary physician if symptoms of infection or other concerning signs are noted.
Cyclophosphamide Counseling:  I discussed with the patient the risks of cyclophosphamide including but not limited to hair loss, hormonal abnormalities, decreased fertility, abdominal pain, diarrhea, nausea and vomiting, bone marrow suppression and infection. The patient understands that monitoring is required while taking this medication.
Terbinafine Pregnancy And Lactation Text: This medication is Pregnancy Category B and is considered safe during pregnancy. It is also excreted in breast milk and breast feeding isn't recommended.
Minocycline Pregnancy And Lactation Text: This medication is Pregnancy Category D and not consider safe during pregnancy. It is also excreted in breast milk.
Azithromycin Pregnancy And Lactation Text: This medication is considered safe during pregnancy and is also secreted in breast milk.
Solaraze Counseling:  I discussed with the patient the risks of Solaraze including but not limited to erythema, scaling, itching, weeping, crusting, and pain.
Cyclophosphamide Pregnancy And Lactation Text: This medication is Pregnancy Category D and it isn't considered safe during pregnancy. This medication is excreted in breast milk.
SSKI Counseling:  I discussed with the patient the risks of SSKI including but not limited to thyroid abnormalities, metallic taste, GI upset, fever, headache, acne, arthralgias, paraesthesias, lymphadenopathy, easy bleeding, arrhythmias, and allergic reaction.
Cosentyx Counseling:  I discussed with the patient the risks of Cosentyx including but not limited to worsening of Crohn's disease, immunosuppression, allergic reactions and infections.  The patient understands that monitoring is required including a PPD at baseline and must alert us or the primary physician if symptoms of infection or other concerning signs are noted.
Hydroxychloroquine Counseling:  I discussed with the patient that a baseline ophthalmologic exam is needed at the start of therapy and every year thereafter while on therapy. A CBC may also be warranted for monitoring.  The side effects of this medication were discussed with the patient, including but not limited to agranulocytosis, aplastic anemia, seizures, rashes, retinopathy, and liver toxicity. Patient instructed to call the office should any adverse effect occur.  The patient verbalized understanding of the proper use and possible adverse effects of Plaquenil.  All the patient's questions and concerns were addressed.
Quinolones Counseling:  I discussed with the patient the risks of fluoroquinolones including but not limited to GI upset, allergic reaction, drug rash, diarrhea, dizziness, photosensitivity, yeast infections, liver function test abnormalities, tendonitis/tendon rupture.
Bactrim Counseling:  I discussed with the patient the risks of sulfa antibiotics including but not limited to GI upset, allergic reaction, drug rash, diarrhea, dizziness, photosensitivity, and yeast infections.  Rarely, more serious reactions can occur including but not limited to aplastic anemia, agranulocytosis, methemoglobinemia, blood dyscrasias, liver or kidney failure, lung infiltrates or desquamative/blistering drug rashes.
Imiquimod Counseling:  I discussed with the patient the risks of imiquimod including but not limited to erythema, scaling, itching, weeping, crusting, and pain.  Patient understands that the inflammatory response to imiquimod is variable from person to person and was educated regarded proper titration schedule.  If flu-like symptoms develop, patient knows to discontinue the medication and contact us.
Arava Counseling:  Patient counseled regarding adverse effects of Arava including but not limited to nausea, vomiting, abnormalities in liver function tests. Patients may develop mouth sores, rash, diarrhea, and abnormalities in blood counts. The patient understands that monitoring is required including LFTs and blood counts.  There is a rare possibility of scarring of the liver and lung problems that can occur when taking methotrexate. Persistent nausea, loss of appetite, pale stools, dark urine, cough, and shortness of breath should be reported immediately. Patient advised to discontinue Arava treatment and consult with a physician prior to attempting conception. The patient will have to undergo a treatment to eliminate Arava from the body prior to conception.
Thalidomide Counseling: I discussed with the patient the risks of thalidomide including but not limited to birth defects, anxiety, weakness, chest pain, dizziness, cough and severe allergy.
Stelara Counseling:  I discussed with the patient the risks of ustekinumab including but not limited to immunosuppression, malignancy, posterior leukoencephalopathy syndrome, and serious infections.  The patient understands that monitoring is required including a PPD at baseline and must alert us or the primary physician if symptoms of infection or other concerning signs are noted.
Cyclosporine Counseling:  I discussed with the patient the risks of cyclosporine including but not limited to hypertension, gingival hyperplasia,myelosuppression, immunosuppression, liver damage, kidney damage, neurotoxicity, lymphoma, and serious infections. The patient understands that monitoring is required including baseline blood pressure, CBC, CMP, lipid panel and uric acid, and then 1-2 times monthly CMP and blood pressure.
Hydroxychloroquine Pregnancy And Lactation Text: This medication has been shown to cause fetal harm but it isn't assigned a Pregnancy Risk Category. There are small amounts excreted in breast milk.
Sski Pregnancy And Lactation Text: This medication is Pregnancy Category D and isn't considered safe during pregnancy. It is excreted in breast milk.
Cimetidine Counseling:  I discussed with the patient the risks of Cimetidine including but not limited to gynecomastia, headache, diarrhea, nausea, drowsiness, arrhythmias, pancreatitis, skin rashes, psychosis, bone marrow suppression and kidney toxicity.
Include Pregnancy/Lactation Warning?: No
Solaraze Pregnancy And Lactation Text: This medication is Pregnancy Category B and is considered safe. There is some data to suggest avoiding during the third trimester. It is unknown if this medication is excreted in breast milk.
Dupixent Counseling: I discussed with the patient the risks of dupilumab including but not limited to eye infection and irritation, cold sores, injection site reactions, worsening of asthma, allergic reactions and increased risk of parasitic infection.  Live vaccines should be avoided while taking dupilumab. Dupilumab will also interact with certain medications such as warfarin and cyclosporine. The patient understands that monitoring is required and they must alert us or the primary physician if symptoms of infection or other concerning signs are noted.
Bactrim Pregnancy And Lactation Text: This medication is Pregnancy Category D and is known to cause fetal risk.  It is also excreted in breast milk.
Topical Retinoid counseling:  Patient advised to apply a pea-sized amount only at bedtime and wait 30 minutes after washing their face before applying.  If too drying, patient may add a non-comedogenic moisturizer. The patient verbalized understanding of the proper use and possible adverse effects of retinoids.  All of the patient's questions and concerns were addressed.
Minoxidil Counseling: Minoxidil is a topical medication which can increase blood flow where it is applied. It is uncertain how this medication increases hair growth. Side effects are uncommon and include stinging and allergic reactions.
Cyclosporine Pregnancy And Lactation Text: This medication is Pregnancy Category C and it isn't know if it is safe during pregnancy. This medication is excreted in breast milk.
Niacinamide Counseling: I recommended taking niacin or niacinamide, also know as vitamin B3, twice daily. Recent evidence suggests that taking vitamin B3 (500 mg twice daily) can reduce the risk of actinic keratoses and non-melanoma skin cancers. Side effects of vitamin B3 include flushing and headache.
Rifampin Counseling: I discussed with the patient the risks of rifampin including but not limited to liver damage, kidney damage, red-orange body fluids, nausea/vomiting and severe allergy.
Doxepin Counseling:  Patient advised that the medication is sedating and not to drive a car after taking this medication. Patient informed of potential adverse effects including but not limited to dry mouth, urinary retention, and blurry vision.  The patient verbalized understanding of the proper use and possible adverse effects of doxepin.  All of the patient's questions and concerns were addressed.
Rifampin Pregnancy And Lactation Text: This medication is Pregnancy Category C and it isn't know if it is safe during pregnancy. It is also excreted in breast milk and should not be used if you are breast feeding.
Cephalexin Counseling: I counseled the patient regarding use of cephalexin as an antibiotic for prophylactic and/or therapeutic purposes. Cephalexin (commonly prescribed under brand name Keflex) is a cephalosporin antibiotic which is active against numerous classes of bacteria, including most skin bacteria. Side effects may include nausea, diarrhea, gastrointestinal upset, rash, hives, yeast infections, and in rare cases, hepatitis, kidney disease, seizures, fever, confusion, neurologic symptoms, and others. Patients with severe allergies to penicillin medications are cautioned that there is about a 10% incidence of cross-reactivity with cephalosporins. When possible, patients with penicillin allergies should use alternatives to cephalosporins for antibiotic therapy.
Niacinamide Pregnancy And Lactation Text: These medications are considered safe during pregnancy.
Methotrexate Counseling:  Patient counseled regarding adverse effects of methotrexate including but not limited to nausea, vomiting, abnormalities in liver function tests. Patients may develop mouth sores, rash, diarrhea, and abnormalities in blood counts. The patient understands that monitoring is required including LFT's and blood counts.  There is a rare possibility of scarring of the liver and lung problems that can occur when taking methotrexate. Persistent nausea, loss of appetite, pale stools, dark urine, cough, and shortness of breath should be reported immediately. Patient advised to discontinue methotrexate treatment at least three months before attempting to become pregnant.  I discussed the need for folate supplements while taking methotrexate.  These supplements can decrease side effects during methotrexate treatment. The patient verbalized understanding of the proper use and possible adverse effects of methotrexate.  All of the patient's questions and concerns were addressed.
Dupixent Pregnancy And Lactation Text: This medication likely crosses the placenta but the risk for the fetus is uncertain. This medication is excreted in breast milk.
Benzoyl Peroxide Counseling: Patient counseled that medicine may cause skin irritation and bleach clothing.  In the event of skin irritation, the patient was advised to reduce the amount of the drug applied or use it less frequently.   The patient verbalized understanding of the proper use and possible adverse effects of benzoyl peroxide.  All of the patient's questions and concerns were addressed.
Taltz Counseling: I discussed with the patient the risks of ixekizumab including but not limited to immunosuppression, serious infections, worsening of inflammatory bowel disease and drug reactions.  The patient understands that monitoring is required including a PPD at baseline and must alert us or the primary physician if symptoms of infection or other concerning signs are noted.
Tazorac Counseling:  Patient advised that medication is irritating and drying.  Patient may need to apply sparingly and wash off after an hour before eventually leaving it on overnight.  The patient verbalized understanding of the proper use and possible adverse effects of tazorac.  All of the patient's questions and concerns were addressed.
Benzoyl Peroxide Pregnancy And Lactation Text: This medication is Pregnancy Category C. It is unknown if benzoyl peroxide is excreted in breast milk.
Cephalexin Pregnancy And Lactation Text: This medication is Pregnancy Category B and considered safe during pregnancy.  It is also excreted in breast milk but can be used safely for shorter doses.
Enbrel Counseling:  I discussed with the patient the risks of etanercept including but not limited to myelosuppression, immunosuppression, autoimmune hepatitis, demyelinating diseases, lymphoma, and infections.  The patient understands that monitoring is required including a PPD at baseline and must alert us or the primary physician if symptoms of infection or other concerning signs are noted.
Valtrex Pregnancy And Lactation Text: this medication is Pregnancy Category B and is considered safe during pregnancy. This medication is not directly found in breast milk but it's metabolite acyclovir is present.
Clofazimine Counseling:  I discussed with the patient the risks of clofazimine including but not limited to skin and eye pigmentation, liver damage, nausea/vomiting, gastrointestinal bleeding and allergy.
Valtrex Counseling: I discussed with the patient the risks of valacyclovir including but not limited to kidney damage, nausea, vomiting and severe allergy.  The patient understands that if the infection seems to be worsening or is not improving, they are to call.
Mirvaso Counseling: Mirvaso is a topical medication which can decrease superficial blood flow where applied. Side effects are uncommon and include stinging, redness and allergic reactions.
Methotrexate Pregnancy And Lactation Text: This medication is Pregnancy Category X and is known to cause fetal harm. This medication is excreted in breast milk.
Tetracycline Counseling: Patient counseled regarding possible photosensitivity and increased risk for sunburn.  Patient instructed to avoid sunlight, if possible.  When exposed to sunlight, patients should wear protective clothing, sunglasses, and sunscreen.  The patient was instructed to call the office immediately if the following severe adverse effects occur:  hearing changes, easy bruising/bleeding, severe headache, or vision changes.  The patient verbalized understanding of the proper use and possible adverse effects of tetracycline.  All of the patient's questions and concerns were addressed. Patient understands to avoid pregnancy while on therapy due to potential birth defects.
Doxepin Pregnancy And Lactation Text: This medication is Pregnancy Category C and it isn't known if it is safe during pregnancy. It is also excreted in breast milk and breast feeding isn't recommended.
Nsaids Counseling: NSAID Counseling: I discussed with the patient that NSAIDs should be taken with food. Prolonged use of NSAIDs can result in the development of stomach ulcers.  Patient advised to stop taking NSAIDs if abdominal pain occurs.  The patient verbalized understanding of the proper use and possible adverse effects of NSAIDs.  All of the patient's questions and concerns were addressed.
Clindamycin Counseling: I counseled the patient regarding use of clindamycin as an antibiotic for prophylactic and/or therapeutic purposes. Clindamycin is active against numerous classes of bacteria, including skin bacteria. Side effects may include nausea, diarrhea, gastrointestinal upset, rash, hives, yeast infections, and in rare cases, colitis.
Clindamycin Pregnancy And Lactation Text: This medication can be used in pregnancy if certain situations. Clindamycin is also present in breast milk.
Nsaids Pregnancy And Lactation Text: These medications are considered safe up to 30 weeks gestation. It is excreted in breast milk.
Tazorac Pregnancy And Lactation Text: This medication is not safe during pregnancy. It is unknown if this medication is excreted in breast milk.
Carac Counseling:  I discussed with the patient the risks of Carac including but not limited to erythema, scaling, itching, weeping, crusting, and pain.
Tremfya Counseling: I discussed with the patient the risks of guselkumab including but not limited to immunosuppression, serious infections, worsening of inflammatory bowel disease and drug reactions.  The patient understands that monitoring is required including a PPD at baseline and must alert us or the primary physician if symptoms of infection or other concerning signs are noted.
Hydroxyzine Counseling: Patient advised that the medication is sedating and not to drive a car after taking this medication.  Patient informed of potential adverse effects including but not limited to dry mouth, urinary retention, and blurry vision.  The patient verbalized understanding of the proper use and possible adverse effects of hydroxyzine.  All of the patient's questions and concerns were addressed.
Prednisone Counseling:  I discussed with the patient the risks of prolonged use of prednisone including but not limited to weight gain, insomnia, osteoporosis, mood changes, diabetes, susceptibility to infection, glaucoma and high blood pressure.  In cases where prednisone use is prolonged, patients should be monitored with blood pressure checks, serum glucose levels and an eye exam.  Additionally, the patient may need to be placed on GI prophylaxis, PCP prophylaxis, and calcium and vitamin D supplementation and/or a bisphosphonate.  The patient verbalized understanding of the proper use and the possible adverse effects of prednisone.  All of the patient's questions and concerns were addressed.
Humira Counseling:  I discussed with the patient the risks of adalimumab including but not limited to myelosuppression, immunosuppression, autoimmune hepatitis, demyelinating diseases, lymphoma, and serious infections.  The patient understands that monitoring is required including a PPD at baseline and must alert us or the primary physician if symptoms of infection or other concerning signs are noted.
Hydroxyzine Pregnancy And Lactation Text: This medication is not safe during pregnancy and should not be taken. It is also excreted in breast milk and breast feeding isn't recommended.
Colchicine Counseling:  Patient counseled regarding adverse effects including but not limited to stomach upset (nausea, vomiting, stomach pain, or diarrhea).  Patient instructed to limit alcohol consumption while taking this medication.  Colchicine may reduce blood counts especially with prolonged use.  The patient understands that monitoring of kidney function and blood counts may be required, especially at baseline. The patient verbalized understanding of the proper use and possible adverse effects of colchicine.  All of the patient's questions and concerns were addressed.
Odomzo Counseling- I discussed with the patient the risks of Odomzo including but not limited to nausea, vomiting, diarrhea, constipation, weight loss, changes in the sense of taste, decreased appetite, muscle spasms, and hair loss.  The patient verbalized understanding of the proper use and possible adverse effects of Odomzo.  All of the patient's questions and concerns were addressed.
Topical Clindamycin Counseling: Patient counseled that this medication may cause skin irritation or allergic reactions.  In the event of skin irritation, the patient was advised to reduce the amount of the drug applied or use it less frequently.   The patient verbalized understanding of the proper use and possible adverse effects of clindamycin.  All of the patient's questions and concerns were addressed.
5-Fu Counseling: 5-Fluorouracil Counseling:  I discussed with the patient the risks of 5-fluorouracil including but not limited to erythema, scaling, itching, weeping, crusting, and pain.
Fluconazole Counseling:  Patient counseled regarding adverse effects of fluconazole including but not limited to headache, diarrhea, nausea, upset stomach, liver function test abnormalities, taste disturbance, and stomach pain.  There is a rare possibility of liver failure that can occur when taking fluconazole.  The patient understands that monitoring of LFTs and kidney function test may be required, especially at baseline. The patient verbalized understanding of the proper use and possible adverse effects of fluconazole.  All of the patient's questions and concerns were addressed.
Xeljanz Counseling: I discussed with the patient the risks of Xeljanz therapy including increased risk of infection, liver issues, headache, diarrhea, or cold symptoms. Live vaccines should be avoided. They were instructed to call if they have any problems.
Doxycycline Counseling:  Patient counseled regarding possible photosensitivity and increased risk for sunburn.  Patient instructed to avoid sunlight, if possible.  When exposed to sunlight, patients should wear protective clothing, sunglasses, and sunscreen.  The patient was instructed to call the office immediately if the following severe adverse effects occur:  hearing changes, easy bruising/bleeding, severe headache, or vision changes.  The patient verbalized understanding of the proper use and possible adverse effects of doxycycline.  All of the patient's questions and concerns were addressed.
Xelhildaz Pregnancy And Lactation Text: This medication is Pregnancy Category D and is not considered safe during pregnancy.  The risk during breast feeding is also uncertain.
Otezla Counseling: The side effects of Otezla were discussed with the patient, including but not limited to worsening or new depression, weight loss, diarrhea, nausea, upper respiratory tract infection, and headache. Patient instructed to call the office should any adverse effect occur.  The patient verbalized understanding of the proper use and possible adverse effects of Otezla.  All the patient's questions and concerns were addressed.
Albendazole Counseling:  I discussed with the patient the risks of albendazole including but not limited to cytopenia, kidney damage, nausea/vomiting and severe allergy.  The patient understands that this medication is being used in an off-label manner.
Acitretin Counseling:  I discussed with the patient the risks of acitretin including but not limited to hair loss, dry lips/skin/eyes, liver damage, hyperlipidemia, depression/suicidal ideation, photosensitivity.  Serious rare side effects can include but are not limited to pancreatitis, pseudotumor cerebri, bony changes, clot formation/stroke/heart attack.  Patient understands that alcohol is contraindicated since it can result in liver toxicity and significantly prolong the elimination of the drug by many years.
Topical Sulfur Applications Counseling: Topical Sulfur Counseling: Patient counseled that this medication may cause skin irritation or allergic reactions.  In the event of skin irritation, the patient was advised to reduce the amount of the drug applied or use it less frequently.   The patient verbalized understanding of the proper use and possible adverse effects of topical sulfur application.  All of the patient's questions and concerns were addressed.
Doxycycline Pregnancy And Lactation Text: This medication is Pregnancy Category D and not consider safe during pregnancy. It is also excreted in breast milk but is considered safe for shorter treatment courses.
Ilumya Counseling: I discussed with the patient the risks of tildrakizumab including but not limited to immunosuppression, malignancy, posterior leukoencephalopathy syndrome, and serious infections.  The patient understands that monitoring is required including a PPD at baseline and must alert us or the primary physician if symptoms of infection or other concerning signs are noted.
Dapsone Counseling: I discussed with the patient the risks of dapsone including but not limited to hemolytic anemia, agranulocytosis, rashes, methemoglobinemia, kidney failure, peripheral neuropathy, headaches, GI upset, and liver toxicity.  Patients who start dapsone require monitoring including baseline LFTs and weekly CBCs for the first month, then every month thereafter.  The patient verbalized understanding of the proper use and possible adverse effects of dapsone.  All of the patient's questions and concerns were addressed.
Erythromycin Counseling:  I discussed with the patient the risks of erythromycin including but not limited to GI upset, allergic reaction, drug rash, diarrhea, increase in liver enzymes, and yeast infections.

## 2019-05-08 NOTE — PROCEDURE: COUNSELING
Detail Level: Detailed
Detail Level: Simple
Patient Specific Counseling (Will Not Stick From Patient To Patient): Patient prefers to try topical to treat sccis. Discussed expectations and side effects of topical therapy. Handout given on imiquimod. Follow up sooner for any questions or concerns

## 2019-05-08 NOTE — PROCEDURE: BIOPSY BY SHAVE METHOD
Render Post-Care Instructions In Note?: yes
Additional Anesthesia Volume In Cc (Will Not Render If 0): 0
Lab Facility: [id number]
Wound Care: Petrolatum
Size Of Lesion In Cm: 0.7
Anesthesia Type: 1% lidocaine with epinephrine
Cryotherapy Text: The wound bed was treated with cryotherapy after the biopsy was performed.
Destruction After The Procedure: No
Type Of Destruction Used: Curettage
Biopsy Type: H and E
Depth Of Biopsy: dermis
Electrodesiccation Text: The wound bed was treated with electrodesiccation after the biopsy was performed.
Dressing: bandage
Silver Nitrate Text: The wound bed was treated with silver nitrate after the biopsy was performed.
Anesthesia Volume In Cc (Will Not Render If 0): 0.5
Electrodesiccation And Curettage Text: The wound bed was treated with electrodesiccation and curettage after the biopsy was performed.
Billing Type: Third-Party Bill
Post-care instructions were given and reviewed in detail. Patient is to keep the biopsy site dry overnight, and then apply vaseline daily until healed. The patient was instructed to call with any questions or concerns.
Lab: -[id number]
Curettage Text: The wound bed was treated with curettage after the biopsy was performed.
Detail Level: Detailed
Consent: Written consent was obtained with patient's permission and risks were reviewed per signed consent form. Biopsy was performed with patient's verbal permission.
Biopsy Method: Personna blade
Notification Instructions: Patient will be notified of biopsy results. However, patient instructed to call the office if not contacted within 2 weeks.
Hemostasis: Aluminum Chloride

## 2019-05-15 ENCOUNTER — APPOINTMENT (RX ONLY)
Dept: URBAN - NONMETROPOLITAN AREA CLINIC 1 | Facility: CLINIC | Age: 74
Setting detail: DERMATOLOGY
End: 2019-05-15

## 2019-05-15 PROBLEM — D04.39 CARCINOMA IN SITU OF SKIN OF OTHER PARTS OF FACE: Status: ACTIVE | Noted: 2019-05-15

## 2019-05-15 PROCEDURE — ? PRESCRIPTION

## 2019-05-15 PROCEDURE — 12052 INTMD RPR FACE/MM 2.6-5.0 CM: CPT

## 2019-05-15 PROCEDURE — 11642 EXC F/E/E/N/L MAL+MRG 1.1-2: CPT

## 2019-05-15 PROCEDURE — ? EXCISION

## 2019-05-15 RX ORDER — MUPIROCIN 20 MG/G
OINTMENT TOPICAL
Qty: 1 | Refills: 1 | Status: ERX | COMMUNITY
Start: 2019-05-15

## 2019-05-15 RX ORDER — DOXYCYCLINE HYCLATE 100 MG/1
TABLET, COATED ORAL
Qty: 10 | Refills: 0 | Status: ERX | COMMUNITY
Start: 2019-05-15

## 2019-05-15 RX ADMIN — DOXYCYCLINE HYCLATE: 100 TABLET, COATED ORAL at 00:00

## 2019-05-15 RX ADMIN — MUPIROCIN: 20 OINTMENT TOPICAL at 00:00

## 2019-05-15 NOTE — PROCEDURE: EXCISION
Epidermal Closure: simple interrupted
Size Of Lesion In Cm: 0.7
Composite Graft Text: The defect edges were debeveled with a #15 scalpel blade.  Given the location of the defect, shape of the defect, the proximity to free margins and the fact the defect was full thickness a composite graft was deemed most appropriate.  The defect was outline and then transferred to the donor site.  A full thickness graft was then excised from the donor site. The graft was then placed in the primary defect, oriented appropriately and then sutured into place.  The secondary defect was then repaired using a primary closure.
Curettage Prior To Excision?: No
Anesthesia Type: 1% lidocaine with epinephrine and a 1:10 solution of 8.4% sodium bicarbonate
Muscle Hinge Flap Text: The defect edges were debeveled with a #15 scalpel blade.  Given the size, depth and location of the defect and the proximity to free margins a muscle hinge flap was deemed most appropriate.  Using a sterile surgical marker, an appropriate hinge flap was drawn incorporating the defect. The area thus outlined was incised with a #15 scalpel blade.  The skin margins were undermined to an appropriate distance in all directions utilizing iris scissors.
O-T Plasty Text: The defect edges were debeveled with a #15 scalpel blade.  Given the location of the defect, shape of the defect and the proximity to free margins an O-T plasty was deemed most appropriate.  Using a sterile surgical marker, an appropriate O-T plasty was drawn incorporating the defect and placing the expected incisions within the relaxed skin tension lines where possible.    The area thus outlined was incised deep to adipose tissue with a #15 scalpel blade.  The skin margins were undermined to an appropriate distance in all directions utilizing iris scissors.
Render Post-Care Instructions In Note?: yes
Crescentic Intermediate Repair Preamble Text (Leave Blank If You Do Not Want): Undermining was performed with blunt dissection.
Complex Repair And Rhombic Flap Text: The defect edges were debeveled with a #15 scalpel blade.  The primary defect was closed partially with a complex linear closure.  Given the location of the remaining defect, shape of the defect and the proximity to free margins a rhombic flap was deemed most appropriate for complete closure of the defect.  Using a sterile surgical marker, an appropriate advancement flap was drawn incorporating the defect and placing the expected incisions within the relaxed skin tension lines where possible.    The area thus outlined was incised deep to adipose tissue with a #15 scalpel blade.  The skin margins were undermined to an appropriate distance in all directions utilizing iris scissors.
X Size Of Lesion In Cm (Optional): 0
Rhombic Flap Text: The defect edges were debeveled with a #15 scalpel blade.  Given the location of the defect and the proximity to free margins a rhombic flap was deemed most appropriate.  Using a sterile surgical marker, an appropriate rhombic flap was drawn incorporating the defect.    The area thus outlined was incised deep to adipose tissue with a #15 scalpel blade.  The skin margins were undermined to an appropriate distance in all directions utilizing iris scissors.
Complex Repair And V-Y Plasty Text: The defect edges were debeveled with a #15 scalpel blade.  The primary defect was closed partially with a complex linear closure.  Given the location of the remaining defect, shape of the defect and the proximity to free margins a V-Y plasty was deemed most appropriate for complete closure of the defect.  Using a sterile surgical marker, an appropriate advancement flap was drawn incorporating the defect and placing the expected incisions within the relaxed skin tension lines where possible.    The area thus outlined was incised deep to adipose tissue with a #15 scalpel blade.  The skin margins were undermined to an appropriate distance in all directions utilizing iris scissors.
Epidermal Autograft Text: The defect edges were debeveled with a #15 scalpel blade.  Given the location of the defect, shape of the defect and the proximity to free margins an epidermal autograft was deemed most appropriate.  Using a sterile surgical marker, the primary defect shape was transferred to the donor site. The epidermal graft was then harvested.  The skin graft was then placed in the primary defect and oriented appropriately.
Melolabial Transposition Flap Text: The defect edges were debeveled with a #15 scalpel blade.  Given the location of the defect and the proximity to free margins a melolabial flap was deemed most appropriate.  Using a sterile surgical marker, an appropriate melolabial transposition flap was drawn incorporating the defect.    The area thus outlined was incised deep to adipose tissue with a #15 scalpel blade.  The skin margins were undermined to an appropriate distance in all directions utilizing iris scissors.
Complex Repair And Transposition Flap Text: The defect edges were debeveled with a #15 scalpel blade.  The primary defect was closed partially with a complex linear closure.  Given the location of the remaining defect, shape of the defect and the proximity to free margins a transposition flap was deemed most appropriate for complete closure of the defect.  Using a sterile surgical marker, an appropriate advancement flap was drawn incorporating the defect and placing the expected incisions within the relaxed skin tension lines where possible.    The area thus outlined was incised deep to adipose tissue with a #15 scalpel blade.  The skin margins were undermined to an appropriate distance in all directions utilizing iris scissors.
O-Z Plasty Text: The defect edges were debeveled with a #15 scalpel blade.  Given the location of the defect, shape of the defect and the proximity to free margins an O-Z plasty (double transposition flap) was deemed most appropriate.  Using a sterile surgical marker, the appropriate transposition flaps were drawn incorporating the defect and placing the expected incisions within the relaxed skin tension lines where possible.    The area thus outlined was incised deep to adipose tissue with a #15 scalpel blade.  The skin margins were undermined to an appropriate distance in all directions utilizing iris scissors.  Hemostasis was achieved with electrocautery.  The flaps were then transposed into place, one clockwise and the other counterclockwise, and anchored with interrupted buried subcutaneous sutures.
S Plasty Text: Given the location and shape of the defect, and the orientation of relaxed skin tension lines, an S-plasty was deemed most appropriate for repair.  Using a sterile surgical marker, the appropriate outline of the S-plasty was drawn, incorporating the defect and placing the expected incisions within the relaxed skin tension lines where possible.  The area thus outlined was incised deep to adipose tissue with a #15 scalpel blade.  The skin margins were undermined to an appropriate distance in all directions utilizing iris scissors. The skin flaps were advanced over the defect.  The opposing margins were then approximated with interrupted buried subcutaneous sutures.
Skin Substitute Text: The defect edges were debeveled with a #15 scalpel blade.  Given the location of the defect, shape of the defect and the proximity to free margins a skin substitute graft was deemed most appropriate.  The graft material was trimmed to fit the size of the defect. The graft was then placed in the primary defect and oriented appropriately.
Size Of Margin In Cm: 0.3
Complex Repair And M Plasty Text: The defect edges were debeveled with a #15 scalpel blade.  The primary defect was closed partially with a complex linear closure.  Given the location of the remaining defect, shape of the defect and the proximity to free margins an M plasty was deemed most appropriate for complete closure of the defect.  Using a sterile surgical marker, an appropriate advancement flap was drawn incorporating the defect and placing the expected incisions within the relaxed skin tension lines where possible.    The area thus outlined was incised deep to adipose tissue with a #15 scalpel blade.  The skin margins were undermined to an appropriate distance in all directions utilizing iris scissors.
Rhomboid Transposition Flap Text: The defect edges were debeveled with a #15 scalpel blade.  Given the location of the defect and the proximity to free margins a rhomboid transposition flap was deemed most appropriate.  Using a sterile surgical marker, an appropriate rhomboid flap was drawn incorporating the defect.    The area thus outlined was incised deep to adipose tissue with a #15 scalpel blade.  The skin margins were undermined to an appropriate distance in all directions utilizing iris scissors.
Information: Selecting Yes will display possible errors in your note based on the variables you have selected. This validation is only offered as a suggestion for you. PLEASE NOTE THAT THE VALIDATION TEXT WILL BE REMOVED WHEN YOU FINALIZE YOUR NOTE. IF YOU WANT TO FAX A PRELIMINARY NOTE YOU WILL NEED TO TOGGLE THIS TO 'NO' IF YOU DO NOT WANT IT IN YOUR FAXED NOTE.
Burow's Advancement Flap Text: The defect edges were debeveled with a #15 scalpel blade.  Given the location of the defect and the proximity to free margins a Burow's advancement flap was deemed most appropriate.  Using a sterile surgical marker, the appropriate advancement flap was drawn incorporating the defect and placing the expected incisions within the relaxed skin tension lines where possible.    The area thus outlined was incised deep to adipose tissue with a #15 scalpel blade.  The skin margins were undermined to an appropriate distance in all directions utilizing iris scissors.
Double O-Z Plasty Text: The defect edges were debeveled with a #15 scalpel blade.  Given the location of the defect, shape of the defect and the proximity to free margins a Double O-Z plasty (double transposition flap) was deemed most appropriate.  Using a sterile surgical marker, the appropriate transposition flaps were drawn incorporating the defect and placing the expected incisions within the relaxed skin tension lines where possible. The area thus outlined was incised deep to adipose tissue with a #15 scalpel blade.  The skin margins were undermined to an appropriate distance in all directions utilizing iris scissors.  Hemostasis was achieved with electrocautery.  The flaps were then transposed into place, one clockwise and the other counterclockwise, and anchored with interrupted buried subcutaneous sutures.
Dermal Autograft Text: The defect edges were debeveled with a #15 scalpel blade.  Given the location of the defect, shape of the defect and the proximity to free margins a dermal autograft was deemed most appropriate.  Using a sterile surgical marker, the primary defect shape was transferred to the donor site. The area thus outlined was incised deep to adipose tissue with a #15 scalpel blade.  The harvested graft was then trimmed of adipose and epidermal tissue until only dermis was left.  The skin graft was then placed in the primary defect and oriented appropriately.
Post-Care Instructions: Detailed post-op care instructions were reviewed with the patient and patient verbalized understanding.  Patient also agreed not to engage in any heavy lifting, exercise, or swimming for the next 14 days. Should the patient develop any fevers, chills, bleeding, severe pain patient will contact the office immediately at [phone].  May use Tylenol as directed for pain.  Avoid Aspirin, Advil, Ibuprofen, Aleve, or Naproxen for the first 3 days to avoid excessive bleeding.  Patient may also contact the office with any questions or problems that arise.
Tissue Cultured Epidermal Autograft Text: The defect edges were debeveled with a #15 scalpel blade.  Given the location of the defect, shape of the defect and the proximity to free margins a tissue cultured epidermal autograft was deemed most appropriate.  The graft was then trimmed to fit the size of the defect.  The graft was then placed in the primary defect and oriented appropriately.
Complex Repair And Double M Plasty Text: The defect edges were debeveled with a #15 scalpel blade.  The primary defect was closed partially with a complex linear closure.  Given the location of the remaining defect, shape of the defect and the proximity to free margins a double M plasty was deemed most appropriate for complete closure of the defect.  Using a sterile surgical marker, an appropriate advancement flap was drawn incorporating the defect and placing the expected incisions within the relaxed skin tension lines where possible.    The area thus outlined was incised deep to adipose tissue with a #15 scalpel blade.  The skin margins were undermined to an appropriate distance in all directions utilizing iris scissors.
Wound Care: Petrolatum
Bi-Rhombic Flap Text: The defect edges were debeveled with a #15 scalpel blade.  Given the location of the defect and the proximity to free margins a bi-rhombic flap was deemed most appropriate.  Using a sterile surgical marker, an appropriate rhombic flap was drawn incorporating the defect. The area thus outlined was incised deep to adipose tissue with a #15 scalpel blade.  The skin margins were undermined to an appropriate distance in all directions utilizing iris scissors.
A-T Advancement Flap Text: The defect edges were debeveled with a #15 scalpel blade.  Given the location of the defect, shape of the defect and the proximity to free margins an A-T advancement flap was deemed most appropriate.  Using a sterile surgical marker, an appropriate advancement flap was drawn incorporating the defect and placing the expected incisions within the relaxed skin tension lines where possible.    The area thus outlined was incised deep to adipose tissue with a #15 scalpel blade.  The skin margins were undermined to an appropriate distance in all directions utilizing iris scissors.
V-Y Plasty Text: The defect edges were debeveled with a #15 scalpel blade.  Given the location of the defect, shape of the defect and the proximity to free margins an V-Y advancement flap was deemed most appropriate.  Using a sterile surgical marker, an appropriate advancement flap was drawn incorporating the defect and placing the expected incisions within the relaxed skin tension lines where possible.    The area thus outlined was incised deep to adipose tissue with a #15 scalpel blade.  The skin margins were undermined to an appropriate distance in all directions utilizing iris scissors.
Excision Method: Elliptical
Crescentic Advancement Flap Text: The defect edges were debeveled with a #15 scalpel blade.  Given the location of the defect and the proximity to free margins a crescentic advancement flap was deemed most appropriate.  Using a sterile surgical marker, the appropriate advancement flap was drawn incorporating the defect and placing the expected incisions within the relaxed skin tension lines where possible.    The area thus outlined was incised deep to adipose tissue with a #15 scalpel blade.  The skin margins were undermined to an appropriate distance in all directions utilizing iris scissors.
Helical Rim Advancement Flap Text: The defect edges were debeveled with a #15 blade scalpel.  Given the location of the defect and the proximity to free margins (helical rim) a double helical rim advancement flap was deemed most appropriate.  Using a sterile surgical marker, the appropriate advancement flaps were drawn incorporating the defect and placing the expected incisions between the helical rim and antihelix where possible.  The area thus outlined was incised through and through with a #15 scalpel blade.  With a skin hook and iris scissors, the flaps were gently and sharply undermined and freed up.
Complex Repair And W Plasty Text: The defect edges were debeveled with a #15 scalpel blade.  The primary defect was closed partially with a complex linear closure.  Given the location of the remaining defect, shape of the defect and the proximity to free margins a W plasty was deemed most appropriate for complete closure of the defect.  Using a sterile surgical marker, an appropriate advancement flap was drawn incorporating the defect and placing the expected incisions within the relaxed skin tension lines where possible.    The area thus outlined was incised deep to adipose tissue with a #15 scalpel blade.  The skin margins were undermined to an appropriate distance in all directions utilizing iris scissors.
O-T Advancement Flap Text: The defect edges were debeveled with a #15 scalpel blade.  Given the location of the defect, shape of the defect and the proximity to free margins an O-T advancement flap was deemed most appropriate.  Using a sterile surgical marker, an appropriate advancement flap was drawn incorporating the defect and placing the expected incisions within the relaxed skin tension lines where possible.    The area thus outlined was incised deep to adipose tissue with a #15 scalpel blade.  The skin margins were undermined to an appropriate distance in all directions utilizing iris scissors.
H Plasty Text: Given the location of the defect, shape of the defect and the proximity to free margins a H-plasty was deemed most appropriate for repair.  Using a sterile surgical marker, the appropriate advancement arms of the H-plasty were drawn incorporating the defect and placing the expected incisions within the relaxed skin tension lines where possible. The area thus outlined was incised deep to adipose tissue with a #15 scalpel blade. The skin margins were undermined to an appropriate distance in all directions utilizing iris scissors.  The opposing advancement arms were then advanced into place in opposite direction and anchored with interrupted buried subcutaneous sutures.
Fusiform Excision Additional Text (Leave Blank If You Do Not Want): The margin was drawn around the clinically apparent lesion.  A fusiform shape was then drawn on the skin incorporating the lesion and margins.  Incisions were then made along these lines to the appropriate tissue plane and the lesion was extirpated.
Xenograft Text: The defect edges were debeveled with a #15 scalpel blade.  Given the location of the defect, shape of the defect and the proximity to free margins a xenograft was deemed most appropriate.  The graft was then trimmed to fit the size of the defect.  The graft was then placed in the primary defect and oriented appropriately.
Curvilinear Excision Additional Text (Leave Blank If You Do Not Want): The margin was drawn around the clinically apparent lesion.  A curvilinear shape was then drawn on the skin incorporating the lesion and margins.  Incisions were then made along these lines to the appropriate tissue plane and the lesion was extirpated.
Dressing: dry sterile dressing
Repair Type: Intermediate
Complex Repair And Z Plasty Text: The defect edges were debeveled with a #15 scalpel blade.  The primary defect was closed partially with a complex linear closure.  Given the location of the remaining defect, shape of the defect and the proximity to free margins a Z plasty was deemed most appropriate for complete closure of the defect.  Using a sterile surgical marker, an appropriate advancement flap was drawn incorporating the defect and placing the expected incisions within the relaxed skin tension lines where possible.    The area thus outlined was incised deep to adipose tissue with a #15 scalpel blade.  The skin margins were undermined to an appropriate distance in all directions utilizing iris scissors.
Bilateral Helical Rim Advancement Flap Text: The defect edges were debeveled with a #15 blade scalpel.  Given the location of the defect and the proximity to free margins (helical rim) a bilateral helical rim advancement flap was deemed most appropriate.  Using a sterile surgical marker, the appropriate advancement flaps were drawn incorporating the defect and placing the expected incisions between the helical rim and antihelix where possible.  The area thus outlined was incised through and through with a #15 scalpel blade.  With a skin hook and iris scissors, the flaps were gently and sharply undermined and freed up.
Elliptical Excision Additional Text (Leave Blank If You Do Not Want): The margin was drawn around the clinically apparent lesion.  An elliptical shape was then drawn on the skin incorporating the lesion and margins.  Incisions were then made along these lines to the appropriate tissue plane and the lesion was extirpated.
Billing Type: Third-Party Bill
W Plasty Text: The lesion was extirpated to the level of the fat with a #15 scalpel blade.  Given the location of the defect, shape of the defect and the proximity to free margins a W-plasty was deemed most appropriate for repair.  Using a sterile surgical marker, the appropriate transposition arms of the W-plasty were drawn incorporating the defect and placing the expected incisions within the relaxed skin tension lines where possible.    The area thus outlined was incised deep to adipose tissue with a #15 scalpel blade.  The skin margins were undermined to an appropriate distance in all directions utilizing iris scissors.  The opposing transposition arms were then transposed into place in opposite direction and anchored with interrupted buried subcutaneous sutures.
Deep Sutures: 5-0 Vicryl
Purse String (Intermediate) Text: Given the location of the defect and the characteristics of the surrounding skin a purse string intermediate closure was deemed most appropriate.  Undermining was performed circumfirentially around the surgical defect.  A purse string suture was then placed and tightened.
O-L Flap Text: The defect edges were debeveled with a #15 scalpel blade.  Given the location of the defect, shape of the defect and the proximity to free margins an O-L flap was deemed most appropriate.  Using a sterile surgical marker, an appropriate advancement flap was drawn incorporating the defect and placing the expected incisions within the relaxed skin tension lines where possible.    The area thus outlined was incised deep to adipose tissue with a #15 scalpel blade.  The skin margins were undermined to an appropriate distance in all directions utilizing iris scissors.
Anesthesia Volume In Cc: 3
Hemostasis: Electrocautery
Saucerization Excision Additional Text (Leave Blank If You Do Not Want): The margin was drawn around the clinically apparent lesion.  Incisions were then made along these lines, in a tangential fashion, to the appropriate tissue plane and the lesion was extirpated.
Purse String (Simple) Text: Given the location of the defect and the characteristics of the surrounding skin a purse string simple closure was deemed most appropriate.  Undermining was performed circumferentially around the surgical defect.  A purse string suture was then placed and tightened.
Complex Repair And Dorsal Nasal Flap Text: The defect edges were debeveled with a #15 scalpel blade.  The primary defect was closed partially with a complex linear closure.  Given the location of the remaining defect, shape of the defect and the proximity to free margins a dorsal nasal flap was deemed most appropriate for complete closure of the defect.  Using a sterile surgical marker, an appropriate flap was drawn incorporating the defect and placing the expected incisions within the relaxed skin tension lines where possible.    The area thus outlined was incised deep to adipose tissue with a #15 scalpel blade.  The skin margins were undermined to an appropriate distance in all directions utilizing iris scissors.
Ear Star Wedge Flap Text: The defect edges were debeveled with a #15 blade scalpel.  Given the location of the defect and the proximity to free margins (helical rim) an ear star wedge flap was deemed most appropriate.  Using a sterile surgical marker, the appropriate flap was drawn incorporating the defect and placing the expected incisions between the helical rim and antihelix where possible.  The area thus outlined was incised through and through with a #15 scalpel blade.
Z Plasty Text: The lesion was extirpated to the level of the fat with a #15 scalpel blade.  Given the location of the defect, shape of the defect and the proximity to free margins a Z-plasty was deemed most appropriate for repair.  Using a sterile surgical marker, the appropriate transposition arms of the Z-plasty were drawn incorporating the defect and placing the expected incisions within the relaxed skin tension lines where possible.    The area thus outlined was incised deep to adipose tissue with a #15 scalpel blade.  The skin margins were undermined to an appropriate distance in all directions utilizing iris scissors.  The opposing transposition arms were then transposed into place in opposite direction and anchored with interrupted buried subcutaneous sutures.
O-Z Flap Text: The defect edges were debeveled with a #15 scalpel blade.  Given the location of the defect, shape of the defect and the proximity to free margins an O-Z flap was deemed most appropriate.  Using a sterile surgical marker, an appropriate transposition flap was drawn incorporating the defect and placing the expected incisions within the relaxed skin tension lines where possible. The area thus outlined was incised deep to adipose tissue with a #15 scalpel blade.  The skin margins were undermined to an appropriate distance in all directions utilizing iris scissors.
Cheek-To-Nose Interpolation Flap Text: A decision was made to reconstruct the defect utilizing an interpolation axial flap and a staged reconstruction.  A telfa template was made of the defect.  This telfa template was then used to outline the Cheek-To-Nose Interpolation flap.  The donor area for the pedicle flap was then injected with anesthesia.  The flap was excised through the skin and subcutaneous tissue down to the layer of the underlying musculature.  The interpolation flap was carefully excised within this deep plane to maintain its blood supply.  The edges of the donor site were undermined.   The donor site was closed in a primary fashion.  The pedicle was then rotated into position and sutured.  Once the tube was sutured into place, adequate blood supply was confirmed with blanching and refill.  The pedicle was then wrapped with xeroform gauze and dressed appropriately with a telfa and gauze bandage to ensure continued blood supply and protect the attached pedicle.
V-Y Flap Text: The defect edges were debeveled with a #15 scalpel blade.  Given the location of the defect, shape of the defect and the proximity to free margins a V-Y flap was deemed most appropriate.  Using a sterile surgical marker, an appropriate advancement flap was drawn incorporating the defect and placing the expected incisions within the relaxed skin tension lines where possible.    The area thus outlined was incised deep to adipose tissue with a #15 scalpel blade.  The skin margins were undermined to an appropriate distance in all directions utilizing iris scissors.
Partial Purse String (Simple) Text: Given the location of the defect and the characteristics of the surrounding skin a simple purse string closure was deemed most appropriate.  Undermining was performed circumferentially around the surgical defect.  A purse string suture was then placed and tightened. Wound tension of the circular defect prevented complete closure of the wound.
Intermediate / Complex Repair - Final Wound Length In Cm: 2.6
Banner Transposition Flap Text: The defect edges were debeveled with a #15 scalpel blade.  Given the location of the defect and the proximity to free margins a Banner transposition flap was deemed most appropriate.  Using a sterile surgical marker, an appropriate flap drawn around the defect. The area thus outlined was incised deep to adipose tissue with a #15 scalpel blade.  The skin margins were undermined to an appropriate distance in all directions utilizing iris scissors.
Complex Repair And Ftsg Text: The defect edges were debeveled with a #15 scalpel blade.  The primary defect was closed partially with a complex linear closure.  Given the location of the defect, shape of the defect and the proximity to free margins a full thickness skin graft was deemed most appropriate to repair the remaining defect.  The graft was trimmed to fit the size of the remaining defect.  The graft was then placed in the primary defect, oriented appropriately, and sutured into place.
Cheek Interpolation Flap Text: A decision was made to reconstruct the defect utilizing an interpolation axial flap and a staged reconstruction.  A telfa template was made of the defect.  This telfa template was then used to outline the Cheek Interpolation flap.  The donor area for the pedicle flap was then injected with anesthesia.  The flap was excised through the skin and subcutaneous tissue down to the layer of the underlying musculature.  The interpolation flap was carefully excised within this deep plane to maintain its blood supply.  The edges of the donor site were undermined.   The donor site was closed in a primary fashion.  The pedicle was then rotated into position and sutured.  Once the tube was sutured into place, adequate blood supply was confirmed with blanching and refill.  The pedicle was then wrapped with xeroform gauze and dressed appropriately with a telfa and gauze bandage to ensure continued blood supply and protect the attached pedicle.
Slit Excision Additional Text (Leave Blank If You Do Not Want): A linear line was drawn on the skin overlying the lesion. An incision was made slowly until the lesion was visualized.  Once visualized, the lesion was removed with blunt dissection.
Double O-Z Flap Text: The defect edges were debeveled with a #15 scalpel blade.  Given the location of the defect, shape of the defect and the proximity to free margins a Double O-Z flap was deemed most appropriate.  Using a sterile surgical marker, an appropriate transposition flap was drawn incorporating the defect and placing the expected incisions within the relaxed skin tension lines where possible. The area thus outlined was incised deep to adipose tissue with a #15 scalpel blade.  The skin margins were undermined to an appropriate distance in all directions utilizing iris scissors.
Partial Purse String (Intermediate) Text: Given the location of the defect and the characteristics of the surrounding skin an intermediate purse string closure was deemed most appropriate.  Undermining was performed circumferentially around the surgical defect.  A purse string suture was then placed and tightened. Wound tension of the circular defect prevented complete closure of the wound.
Wound Check: 7 days
Complex Repair And Single Advancement Flap Text: The defect edges were debeveled with a #15 scalpel blade.  The primary defect was closed partially with a complex linear closure.  Given the location of the remaining defect, shape of the defect and the proximity to free margins a single advancement flap was deemed most appropriate for complete closure of the defect.  Using a sterile surgical marker, an appropriate advancement flap was drawn incorporating the defect and placing the expected incisions within the relaxed skin tension lines where possible.    The area thus outlined was incised deep to adipose tissue with a #15 scalpel blade.  The skin margins were undermined to an appropriate distance in all directions utilizing iris scissors.
Advancement-Rotation Flap Text: The defect edges were debeveled with a #15 scalpel blade.  Given the location of the defect, shape of the defect and the proximity to free margins an advancement-rotation flap was deemed most appropriate.  Using a sterile surgical marker, an appropriate flap was drawn incorporating the defect and placing the expected incisions within the relaxed skin tension lines where possible. The area thus outlined was incised deep to adipose tissue with a #15 scalpel blade.  The skin margins were undermined to an appropriate distance in all directions utilizing iris scissors.
Complex Repair And Epidermal Autograft Text: The defect edges were debeveled with a #15 scalpel blade.  The primary defect was closed partially with a complex linear closure.  Given the location of the defect, shape of the defect and the proximity to free margins an epidermal autograft was deemed most appropriate to repair the remaining defect.  The graft was trimmed to fit the size of the remaining defect.  The graft was then placed in the primary defect, oriented appropriately, and sutured into place.
Bilobed Transposition Flap Text: The defect edges were debeveled with a #15 scalpel blade.  Given the location of the defect and the proximity to free margins a bilobed transposition flap was deemed most appropriate.  Using a sterile surgical marker, an appropriate bilobe flap drawn around the defect.    The area thus outlined was incised deep to adipose tissue with a #15 scalpel blade.  The skin margins were undermined to an appropriate distance in all directions utilizing iris scissors.
Interpolation Flap Text: A decision was made to reconstruct the defect utilizing an interpolation axial flap and a staged reconstruction.  A telfa template was made of the defect.  This telfa template was then used to outline the interpolation flap.  The donor area for the pedicle flap was then injected with anesthesia.  The flap was excised through the skin and subcutaneous tissue down to the layer of the underlying musculature.  The interpolation flap was carefully excised within this deep plane to maintain its blood supply.  The edges of the donor site were undermined.   The donor site was closed in a primary fashion.  The pedicle was then rotated into position and sutured.  Once the tube was sutured into place, adequate blood supply was confirmed with blanching and refill.  The pedicle was then wrapped with xeroform gauze and dressed appropriately with a telfa and gauze bandage to ensure continued blood supply and protect the attached pedicle.
Excisional Biopsy Additional Text (Leave Blank If You Do Not Want): The margin was drawn around the clinically apparent lesion. An elliptical shape was then drawn on the skin incorporating the lesion and margins.  Incisions were then made along these lines to the appropriate tissue plane and the lesion was extirpated.
Lab: -[id number]
Bilobed Flap Text: The defect edges were debeveled with a #15 scalpel blade.  Given the location of the defect and the proximity to free margins a bilobe flap was deemed most appropriate.  Using a sterile surgical marker, an appropriate bilobe flap drawn around the defect.    The area thus outlined was incised deep to adipose tissue with a #15 scalpel blade.  The skin margins were undermined to an appropriate distance in all directions utilizing iris scissors.
Complex Repair And Split-Thickness Skin Graft Text: The defect edges were debeveled with a #15 scalpel blade.  The primary defect was closed partially with a complex linear closure.  Given the location of the defect, shape of the defect and the proximity to free margins a split thickness skin graft was deemed most appropriate to repair the remaining defect.  The graft was trimmed to fit the size of the remaining defect.  The graft was then placed in the primary defect, oriented appropriately, and sutured into place.
Estimated Blood Loss (Cc): minimal
Trilobed Flap Text: The defect edges were debeveled with a #15 scalpel blade.  Given the location of the defect and the proximity to free margins a trilobed flap was deemed most appropriate.  Using a sterile surgical marker, an appropriate trilobed flap drawn around the defect.    The area thus outlined was incised deep to adipose tissue with a #15 scalpel blade.  The skin margins were undermined to an appropriate distance in all directions utilizing iris scissors.
Complex Repair And Double Advancement Flap Text: The defect edges were debeveled with a #15 scalpel blade.  The primary defect was closed partially with a complex linear closure.  Given the location of the remaining defect, shape of the defect and the proximity to free margins a double advancement flap was deemed most appropriate for complete closure of the defect.  Using a sterile surgical marker, an appropriate advancement flap was drawn incorporating the defect and placing the expected incisions within the relaxed skin tension lines where possible.    The area thus outlined was incised deep to adipose tissue with a #15 scalpel blade.  The skin margins were undermined to an appropriate distance in all directions utilizing iris scissors.
Mercedes Flap Text: The defect edges were debeveled with a #15 scalpel blade.  Given the location of the defect, shape of the defect and the proximity to free margins a Mercedes flap was deemed most appropriate.  Using a sterile surgical marker, an appropriate advancement flap was drawn incorporating the defect and placing the expected incisions within the relaxed skin tension lines where possible. The area thus outlined was incised deep to adipose tissue with a #15 scalpel blade.  The skin margins were undermined to an appropriate distance in all directions utilizing iris scissors.
Complex Repair And Dermal Autograft Text: The defect edges were debeveled with a #15 scalpel blade.  The primary defect was closed partially with a complex linear closure.  Given the location of the defect, shape of the defect and the proximity to free margins an dermal autograft was deemed most appropriate to repair the remaining defect.  The graft was trimmed to fit the size of the remaining defect.  The graft was then placed in the primary defect, oriented appropriately, and sutured into place.
Positioning (Leave Blank If You Do Not Want): The patient was placed in a comfortable position exposing the surgical site.
Perilesional Excision Additional Text (Leave Blank If You Do Not Want): The margin was drawn around the clinically apparent lesion. Incisions were then made along these lines to the appropriate tissue plane and the lesion was extirpated.
Melolabial Interpolation Flap Text: A decision was made to reconstruct the defect utilizing an interpolation axial flap and a staged reconstruction.  A telfa template was made of the defect.  This telfa template was then used to outline the melolabial interpolation flap.  The donor area for the pedicle flap was then injected with anesthesia.  The flap was excised through the skin and subcutaneous tissue down to the layer of the underlying musculature.  The pedicle flap was carefully excised within this deep plane to maintain its blood supply.  The edges of the donor site were undermined.   The donor site was closed in a primary fashion.  The pedicle was then rotated into position and sutured.  Once the tube was sutured into place, adequate blood supply was confirmed with blanching and refill.  The pedicle was then wrapped with xeroform gauze and dressed appropriately with a telfa and gauze bandage to ensure continued blood supply and protect the attached pedicle.
Repair Performed By Another Provider Text (Leave Blank If You Do Not Want): After the tissue was excised the defect was repaired by another provider.
Pre-Excision Curettage Text (Leave Blank If You Do Not Want): Prior to drawing the surgical margin the visible lesion was removed with electrodesiccation and curettage to clearly define the lesion size.
Dorsal Nasal Flap Text: The defect edges were debeveled with a #15 scalpel blade.  Given the location of the defect and the proximity to free margins a dorsal nasal flap was deemed most appropriate.  Using a sterile surgical marker, an appropriate dorsal nasal flap was drawn around the defect.    The area thus outlined was incised deep to adipose tissue with a #15 scalpel blade.  The skin margins were undermined to an appropriate distance in all directions utilizing iris scissors.
Complex Repair And Tissue Cultured Epidermal Autograft Text: The defect edges were debeveled with a #15 scalpel blade.  The primary defect was closed partially with a complex linear closure.  Given the location of the defect, shape of the defect and the proximity to free margins an tissue cultured epidermal autograft was deemed most appropriate to repair the remaining defect.  The graft was trimmed to fit the size of the remaining defect.  The graft was then placed in the primary defect, oriented appropriately, and sutured into place.
Excision Depth: adipose tissue
Mastoid Interpolation Flap Text: A decision was made to reconstruct the defect utilizing an interpolation axial flap and a staged reconstruction.  A telfa template was made of the defect.  This telfa template was then used to outline the mastoid interpolation flap.  The donor area for the pedicle flap was then injected with anesthesia.  The flap was excised through the skin and subcutaneous tissue down to the layer of the underlying musculature.  The pedicle flap was carefully excised within this deep plane to maintain its blood supply.  The edges of the donor site were undermined.   The donor site was closed in a primary fashion.  The pedicle was then rotated into position and sutured.  Once the tube was sutured into place, adequate blood supply was confirmed with blanching and refill.  The pedicle was then wrapped with xeroform gauze and dressed appropriately with a telfa and gauze bandage to ensure continued blood supply and protect the attached pedicle.
Graft Donor Site Bandage (Optional-Leave Blank If You Don't Want In Note): Steri-strips and a pressure bandage were applied to the donor site.
Modified Advancement Flap Text: The defect edges were debeveled with a #15 scalpel blade.  Given the location of the defect, shape of the defect and the proximity to free margins a modified advancement flap was deemed most appropriate.  Using a sterile surgical marker, an appropriate advancement flap was drawn incorporating the defect and placing the expected incisions within the relaxed skin tension lines where possible.    The area thus outlined was incised deep to adipose tissue with a #15 scalpel blade.  The skin margins were undermined to an appropriate distance in all directions utilizing iris scissors.
Complex Repair And Modified Advancement Flap Text: The defect edges were debeveled with a #15 scalpel blade.  The primary defect was closed partially with a complex linear closure.  Given the location of the remaining defect, shape of the defect and the proximity to free margins a modified advancement flap was deemed most appropriate for complete closure of the defect.  Using a sterile surgical marker, an appropriate advancement flap was drawn incorporating the defect and placing the expected incisions within the relaxed skin tension lines where possible.    The area thus outlined was incised deep to adipose tissue with a #15 scalpel blade.  The skin margins were undermined to an appropriate distance in all directions utilizing iris scissors.
Epidermal Sutures: 6-0 Prolene
Detail Level: Detailed
No Repair - Repaired With Adjacent Surgical Defect Text (Leave Blank If You Do Not Want): After the excision the defect was repaired concurrently with another surgical defect which was in close approximation.
Posterior Auricular Interpolation Flap Text: A decision was made to reconstruct the defect utilizing an interpolation axial flap and a staged reconstruction.  A telfa template was made of the defect.  This telfa template was then used to outline the posterior auricular interpolation flap.  The donor area for the pedicle flap was then injected with anesthesia.  The flap was excised through the skin and subcutaneous tissue down to the layer of the underlying musculature.  The pedicle flap was carefully excised within this deep plane to maintain its blood supply.  The edges of the donor site were undermined.   The donor site was closed in a primary fashion.  The pedicle was then rotated into position and sutured.  Once the tube was sutured into place, adequate blood supply was confirmed with blanching and refill.  The pedicle was then wrapped with xeroform gauze and dressed appropriately with a telfa and gauze bandage to ensure continued blood supply and protect the attached pedicle.
Scalpel Size: 15 blade
Mucosal Advancement Flap Text: Given the location of the defect, shape of the defect and the proximity to free margins a mucosal advancement flap was deemed most appropriate. Incisions were made with a 15 blade scalpel in the appropriate fashion along the cutaneous vermilion border and the mucosal lip. The remaining actinically damaged mucosal tissue was excised.  The mucosal advancement flap was then elevated to the gingival sulcus with care taken to preserve the neurovascular structures and advanced into the primary defect. Care was taken to ensure that precise realignment of the vermilion border was achieved.
Complex Repair And A-T Advancement Flap Text: The defect edges were debeveled with a #15 scalpel blade.  The primary defect was closed partially with a complex linear closure.  Given the location of the remaining defect, shape of the defect and the proximity to free margins an A-T advancement flap was deemed most appropriate for complete closure of the defect.  Using a sterile surgical marker, an appropriate advancement flap was drawn incorporating the defect and placing the expected incisions within the relaxed skin tension lines where possible.    The area thus outlined was incised deep to adipose tissue with a #15 scalpel blade.  The skin margins were undermined to an appropriate distance in all directions utilizing iris scissors.
Complex Repair And Xenograft Text: The defect edges were debeveled with a #15 scalpel blade.  The primary defect was closed partially with a complex linear closure.  Given the location of the defect, shape of the defect and the proximity to free margins a xenograft was deemed most appropriate to repair the remaining defect.  The graft was trimmed to fit the size of the remaining defect.  The graft was then placed in the primary defect, oriented appropriately, and sutured into place.
Island Pedicle Flap Text: The defect edges were debeveled with a #15 scalpel blade.  Given the location of the defect, shape of the defect and the proximity to free margins an island pedicle advancement flap was deemed most appropriate.  Using a sterile surgical marker, an appropriate advancement flap was drawn incorporating the defect, outlining the appropriate donor tissue and placing the expected incisions within the relaxed skin tension lines where possible.    The area thus outlined was incised deep to adipose tissue with a #15 scalpel blade.  The skin margins were undermined to an appropriate distance in all directions around the primary defect and laterally outward around the island pedicle utilizing iris scissors.  There was minimal undermining beneath the pedicle flap.
Complex Repair Preamble Text (Leave Blank If You Do Not Want): Extensive wide undermining was performed.
Path Notes (To The Dermatopathologist): Please check margins.
Complex Repair And O-L Flap Text: The defect edges were debeveled with a #15 scalpel blade.  The primary defect was closed partially with a complex linear closure.  Given the location of the remaining defect, shape of the defect and the proximity to free margins an O-L flap was deemed most appropriate for complete closure of the defect.  Using a sterile surgical marker, an appropriate flap was drawn incorporating the defect and placing the expected incisions within the relaxed skin tension lines where possible.    The area thus outlined was incised deep to adipose tissue with a #15 scalpel blade.  The skin margins were undermined to an appropriate distance in all directions utilizing iris scissors.
Rotation Flap Text: The defect edges were debeveled with a #15 scalpel blade.  Given the location of the defect, shape of the defect and the proximity to free margins a rotation flap was deemed most appropriate.  Using a sterile surgical marker, an appropriate rotation flap was drawn incorporating the defect and placing the expected incisions within the relaxed skin tension lines where possible.    The area thus outlined was incised deep to adipose tissue with a #15 scalpel blade.  The skin margins were undermined to an appropriate distance in all directions utilizing iris scissors.
Paramedian Forehead Flap Text: A decision was made to reconstruct the defect utilizing an interpolation axial flap and a staged reconstruction.  A telfa template was made of the defect.  This telfa template was then used to outline the paramedian forehead pedicle flap.  The donor area for the pedicle flap was then injected with anesthesia.  The flap was excised through the skin and subcutaneous tissue down to the layer of the underlying musculature.  The pedicle flap was carefully excised within this deep plane to maintain its blood supply.  The edges of the donor site were undermined.   The donor site was closed in a primary fashion.  The pedicle was then rotated into position and sutured.  Once the tube was sutured into place, adequate blood supply was confirmed with blanching and refill.  The pedicle was then wrapped with xeroform gauze and dressed appropriately with a telfa and gauze bandage to ensure continued blood supply and protect the attached pedicle.
Complex Repair And O-T Advancement Flap Text: The defect edges were debeveled with a #15 scalpel blade.  The primary defect was closed partially with a complex linear closure.  Given the location of the remaining defect, shape of the defect and the proximity to free margins an O-T advancement flap was deemed most appropriate for complete closure of the defect.  Using a sterile surgical marker, an appropriate advancement flap was drawn incorporating the defect and placing the expected incisions within the relaxed skin tension lines where possible.    The area thus outlined was incised deep to adipose tissue with a #15 scalpel blade.  The skin margins were undermined to an appropriate distance in all directions utilizing iris scissors.
Advancement Flap (Single) Text: The defect edges were debeveled with a #15 scalpel blade.  Given the location of the defect and the proximity to free margins a single advancement flap was deemed most appropriate.  Using a sterile surgical marker, an appropriate advancement flap was drawn incorporating the defect and placing the expected incisions within the relaxed skin tension lines where possible.    The area thus outlined was incised deep to adipose tissue with a #15 scalpel blade.  The skin margins were undermined to an appropriate distance in all directions utilizing iris scissors.
Hatchet Flap Text: The defect edges were debeveled with a #15 scalpel blade.  Given the location of the defect, shape of the defect and the proximity to free margins a hatchet flap was deemed most appropriate.  Using a sterile surgical marker, an appropriate hatchet flap was drawn incorporating the defect and placing the expected incisions within the relaxed skin tension lines where possible.    The area thus outlined was incised deep to adipose tissue with a #15 scalpel blade.  The skin margins were undermined to an appropriate distance in all directions utilizing iris scissors.
Complex Repair And Skin Substitute Graft Text: The defect edges were debeveled with a #15 scalpel blade.  The primary defect was closed partially with a complex linear closure.  Given the location of the remaining defect, shape of the defect and the proximity to free margins a skin substitute graft was deemed most appropriate to repair the remaining defect.  The graft was trimmed to fit the size of the remaining defect.  The graft was then placed in the primary defect, oriented appropriately, and sutured into place.
Island Pedicle Flap With Canthal Suspension Text: The defect edges were debeveled with a #15 scalpel blade.  Given the location of the defect, shape of the defect and the proximity to free margins an island pedicle advancement flap was deemed most appropriate.  Using a sterile surgical marker, an appropriate advancement flap was drawn incorporating the defect, outlining the appropriate donor tissue and placing the expected incisions within the relaxed skin tension lines where possible. The area thus outlined was incised deep to adipose tissue with a #15 scalpel blade.  The skin margins were undermined to an appropriate distance in all directions around the primary defect and laterally outward around the island pedicle utilizing iris scissors.  There was minimal undermining beneath the pedicle flap. A suspension suture was placed in the canthal tendon to prevent tension and prevent ectropion.
Spiral Flap Text: The defect edges were debeveled with a #15 scalpel blade.  Given the location of the defect, shape of the defect and the proximity to free margins a spiral flap was deemed most appropriate.  Using a sterile surgical marker, an appropriate rotation flap was drawn incorporating the defect and placing the expected incisions within the relaxed skin tension lines where possible. The area thus outlined was incised deep to adipose tissue with a #15 scalpel blade.  The skin margins were undermined to an appropriate distance in all directions utilizing iris scissors.
Complex Repair And Bilobe Flap Text: The defect edges were debeveled with a #15 scalpel blade.  The primary defect was closed partially with a complex linear closure.  Given the location of the remaining defect, shape of the defect and the proximity to free margins a bilobe flap was deemed most appropriate for complete closure of the defect.  Using a sterile surgical marker, an appropriate advancement flap was drawn incorporating the defect and placing the expected incisions within the relaxed skin tension lines where possible.    The area thus outlined was incised deep to adipose tissue with a #15 scalpel blade.  The skin margins were undermined to an appropriate distance in all directions utilizing iris scissors.
Double Island Pedicle Flap Text: The defect edges were debeveled with a #15 scalpel blade.  Given the location of the defect, shape of the defect and the proximity to free margins a double island pedicle advancement flap was deemed most appropriate.  Using a sterile surgical marker, an appropriate advancement flap was drawn incorporating the defect, outlining the appropriate donor tissue and placing the expected incisions within the relaxed skin tension lines where possible.    The area thus outlined was incised deep to adipose tissue with a #15 scalpel blade.  The skin margins were undermined to an appropriate distance in all directions around the primary defect and laterally outward around the island pedicle utilizing iris scissors.  There was minimal undermining beneath the pedicle flap.
Advancement Flap (Double) Text: The defect edges were debeveled with a #15 scalpel blade.  Given the location of the defect and the proximity to free margins a double advancement flap was deemed most appropriate.  Using a sterile surgical marker, the appropriate advancement flaps were drawn incorporating the defect and placing the expected incisions within the relaxed skin tension lines where possible.    The area thus outlined was incised deep to adipose tissue with a #15 scalpel blade.  The skin margins were undermined to an appropriate distance in all directions utilizing iris scissors.
Alar Island Pedicle Flap Text: The defect edges were debeveled with a #15 scalpel blade.  Given the location of the defect, shape of the defect and the proximity to the alar rim an island pedicle advancement flap was deemed most appropriate.  Using a sterile surgical marker, an appropriate advancement flap was drawn incorporating the defect, outlining the appropriate donor tissue and placing the expected incisions within the nasal ala running parallel to the alar rim. The area thus outlined was incised with a #15 scalpel blade.  The skin margins were undermined minimally to an appropriate distance in all directions around the primary defect and laterally outward around the island pedicle utilizing iris scissors.  There was minimal undermining beneath the pedicle flap.
Lip Wedge Excision Repair Text: Given the location of the defect and the proximity to free margins a full thickness wedge repair was deemed most appropriate.  Using a sterile surgical marker, the appropriate repair was drawn incorporating the defect and placing the expected incisions perpendicular to the vermilion border.  The vermilion border was also meticulously outlined to ensure appropriate reapproximation during the repair.  The area thus outlined was incised through and through with a #15 scalpel blade.  The muscularis and dermis were reaproximated with deep sutures following hemostasis. Care was taken to realign the vermilion border before proceeding with the superficial closure.  Once the vermilion was realigned the superfical and mucosal closure was finished.
Split-Thickness Skin Graft Text: The defect edges were debeveled with a #15 scalpel blade.  Given the location of the defect, shape of the defect and the proximity to free margins a split thickness skin graft was deemed most appropriate.  Using a sterile surgical marker, the primary defect shape was transferred to the donor site. The split thickness graft was then harvested.  The skin graft was then placed in the primary defect and oriented appropriately.
Star Wedge Flap Text: The defect edges were debeveled with a #15 scalpel blade.  Given the location of the defect, shape of the defect and the proximity to free margins a star wedge flap was deemed most appropriate.  Using a sterile surgical marker, an appropriate rotation flap was drawn incorporating the defect and placing the expected incisions within the relaxed skin tension lines where possible. The area thus outlined was incised deep to adipose tissue with a #15 scalpel blade.  The skin margins were undermined to an appropriate distance in all directions utilizing iris scissors.
Complex Repair And Melolabial Flap Text: The defect edges were debeveled with a #15 scalpel blade.  The primary defect was closed partially with a complex linear closure.  Given the location of the remaining defect, shape of the defect and the proximity to free margins a melolabial flap was deemed most appropriate for complete closure of the defect.  Using a sterile surgical marker, an appropriate advancement flap was drawn incorporating the defect and placing the expected incisions within the relaxed skin tension lines where possible.    The area thus outlined was incised deep to adipose tissue with a #15 scalpel blade.  The skin margins were undermined to an appropriate distance in all directions utilizing iris scissors.
Island Pedicle Flap-Requiring Vessel Identification Text: The defect edges were debeveled with a #15 scalpel blade.  Given the location of the defect, shape of the defect and the proximity to free margins an island pedicle advancement flap was deemed most appropriate.  Using a sterile surgical marker, an appropriate advancement flap was drawn, based on the axial vessel mentioned above, incorporating the defect, outlining the appropriate donor tissue and placing the expected incisions within the relaxed skin tension lines where possible.    The area thus outlined was incised deep to adipose tissue with a #15 scalpel blade.  The skin margins were undermined to an appropriate distance in all directions around the primary defect and laterally outward around the island pedicle utilizing iris scissors.  There was minimal undermining beneath the pedicle flap.
Ftsg Text: The defect edges were debeveled with a #15 scalpel blade.  Given the location of the defect, shape of the defect and the proximity to free margins a full thickness skin graft was deemed most appropriate.  Using a sterile surgical marker, the primary defect shape was transferred to the donor site. The area thus outlined was incised deep to adipose tissue with a #15 scalpel blade.  The harvested graft was then trimmed of adipose tissue until only dermis and epidermis was left.  The skin margins of the secondary defect were undermined to an appropriate distance in all directions utilizing iris scissors.  The secondary defect was closed with interrupted buried subcutaneous sutures.  The skin edges were then re-apposed with running  sutures.  The skin graft was then placed in the primary defect and oriented appropriately.
Keystone Flap Text: The defect edges were debeveled with a #15 scalpel blade.  Given the location of the defect, shape of the defect a keystone flap was deemed most appropriate.  Using a sterile surgical marker, an appropriate keystone flap was drawn incorporating the defect, outlining the appropriate donor tissue and placing the expected incisions within the relaxed skin tension lines where possible. The area thus outlined was incised deep to adipose tissue with a #15 scalpel blade.  The skin margins were undermined to an appropriate distance in all directions around the primary defect and laterally outward around the flap utilizing iris scissors.
Cartilage Graft Text: The defect edges were debeveled with a #15 scalpel blade.  Given the location of the defect, shape of the defect, the fact the defect involved a full thickness cartilage defect a cartilage graft was deemed most appropriate.  An appropriate donor site was identified, cleansed, and anesthetized. The cartilage graft was then harvested and transferred to the recipient site, oriented appropriately and then sutured into place.  The secondary defect was then repaired using a primary closure.
Consent: Verbal and written consent were obtained from the patient. The risks and benefits to the procedure were discussed in detail. Specifically, the risks of infection, scarring, alteration in appearance, bleeding, prolonged wound healing, incomplete removal, allergy to anesthesia, nerve injury and recurrence were addressed. Prior to the procedure, the treatment site was clearly identified and confirmed by the patient.
Complex Repair And Rotation Flap Text: The defect edges were debeveled with a #15 scalpel blade.  The primary defect was closed partially with a complex linear closure.  Given the location of the remaining defect, shape of the defect and the proximity to free margins a rotation flap was deemed most appropriate for complete closure of the defect.  Using a sterile surgical marker, an appropriate advancement flap was drawn incorporating the defect and placing the expected incisions within the relaxed skin tension lines where possible.    The area thus outlined was incised deep to adipose tissue with a #15 scalpel blade.  The skin margins were undermined to an appropriate distance in all directions utilizing iris scissors.
Transposition Flap Text: The defect edges were debeveled with a #15 scalpel blade.  Given the location of the defect and the proximity to free margins a transposition flap was deemed most appropriate.  Using a sterile surgical marker, an appropriate transposition flap was drawn incorporating the defect.    The area thus outlined was incised deep to adipose tissue with a #15 scalpel blade.  The skin margins were undermined to an appropriate distance in all directions utilizing iris scissors.

## 2019-05-23 ENCOUNTER — APPOINTMENT (RX ONLY)
Dept: URBAN - NONMETROPOLITAN AREA CLINIC 1 | Facility: CLINIC | Age: 74
Setting detail: DERMATOLOGY
End: 2019-05-23

## 2019-05-23 DIAGNOSIS — Z48.02 ENCOUNTER FOR REMOVAL OF SUTURES: ICD-10-CM | Status: IMPROVED

## 2019-05-23 PROCEDURE — ? SUTURE REMOVAL (GLOBAL PERIOD)

## 2019-05-23 PROCEDURE — 99024 POSTOP FOLLOW-UP VISIT: CPT

## 2019-05-23 ASSESSMENT — LOCATION ZONE DERM: LOCATION ZONE: FACE

## 2019-05-23 ASSESSMENT — LOCATION DETAILED DESCRIPTION DERM: LOCATION DETAILED: LEFT MEDIAL MALAR CHEEK

## 2019-05-23 ASSESSMENT — LOCATION SIMPLE DESCRIPTION DERM: LOCATION SIMPLE: LEFT CHEEK

## 2019-05-23 NOTE — PROCEDURE: SUTURE REMOVAL (GLOBAL PERIOD)
Add 07444 Cpt? (Important Note: In 2017 The Use Of 63000 Is Being Tracked By Cms To Determine Future Global Period Reimbursement For Global Periods): yes
Detail Level: Detailed

## 2019-08-07 ENCOUNTER — APPOINTMENT (RX ONLY)
Dept: URBAN - NONMETROPOLITAN AREA CLINIC 1 | Facility: CLINIC | Age: 74
Setting detail: DERMATOLOGY
End: 2019-08-07

## 2019-08-07 DIAGNOSIS — Z02.9 ENCOUNTER FOR ADMINISTRATIVE EXAMINATIONS, UNSPECIFIED: ICD-10-CM

## 2019-10-23 ENCOUNTER — HOSPITAL ENCOUNTER (OUTPATIENT)
Dept: ULTRASOUND IMAGING | Age: 74
Discharge: HOME | End: 2019-10-23
Attending: INTERNAL MEDICINE
Payer: MEDICARE

## 2019-10-23 DIAGNOSIS — R16.1 SPLENOMEGALY: ICD-10-CM

## 2019-10-23 PROCEDURE — 76705 ECHO EXAM OF ABDOMEN: CPT

## 2019-11-15 ENCOUNTER — APPOINTMENT (RX ONLY)
Dept: URBAN - NONMETROPOLITAN AREA CLINIC 1 | Facility: CLINIC | Age: 74
Setting detail: DERMATOLOGY
End: 2019-11-15

## 2019-11-15 DIAGNOSIS — Z85.828 PERSONAL HISTORY OF OTHER MALIGNANT NEOPLASM OF SKIN: ICD-10-CM | Status: WELL CONTROLLED

## 2019-11-15 DIAGNOSIS — L57.0 ACTINIC KERATOSIS: ICD-10-CM

## 2019-11-15 PROCEDURE — 17000 DESTRUCT PREMALG LESION: CPT

## 2019-11-15 PROCEDURE — 17003 DESTRUCT PREMALG LES 2-14: CPT

## 2019-11-15 PROCEDURE — ? COUNSELING

## 2019-11-15 PROCEDURE — ? EDUCATIONAL RESOURCES PROVIDED

## 2019-11-15 PROCEDURE — ? LIQUID NITROGEN

## 2019-11-15 PROCEDURE — 99212 OFFICE O/P EST SF 10 MIN: CPT | Mod: 25

## 2019-11-15 ASSESSMENT — LOCATION DETAILED DESCRIPTION DERM
LOCATION DETAILED: RIGHT INFERIOR CENTRAL MALAR CHEEK
LOCATION DETAILED: LEFT NASAL SIDEWALL
LOCATION DETAILED: RIGHT INFERIOR NASAL CHEEK
LOCATION DETAILED: RIGHT CENTRAL EYEBROW
LOCATION DETAILED: LEFT MEDIAL MALAR CHEEK
LOCATION DETAILED: LEFT MEDIAL MALAR CHEEK
LOCATION DETAILED: LEFT CAVUM CONCHA
LOCATION DETAILED: LEFT SUPERIOR CRUS OF ANTIHELIX

## 2019-11-15 ASSESSMENT — LOCATION SIMPLE DESCRIPTION DERM
LOCATION SIMPLE: LEFT EAR
LOCATION SIMPLE: LEFT CHEEK
LOCATION SIMPLE: RIGHT CHEEK
LOCATION SIMPLE: LEFT CHEEK
LOCATION SIMPLE: LEFT NOSE
LOCATION SIMPLE: RIGHT EYEBROW

## 2019-11-15 ASSESSMENT — LOCATION ZONE DERM
LOCATION ZONE: NOSE
LOCATION ZONE: FACE
LOCATION ZONE: FACE
LOCATION ZONE: EAR

## 2019-12-10 ENCOUNTER — APPOINTMENT (RX ONLY)
Dept: URBAN - NONMETROPOLITAN AREA CLINIC 1 | Facility: CLINIC | Age: 74
Setting detail: DERMATOLOGY
End: 2019-12-10

## 2019-12-10 DIAGNOSIS — L82.1 OTHER SEBORRHEIC KERATOSIS: ICD-10-CM

## 2019-12-10 DIAGNOSIS — L57.0 ACTINIC KERATOSIS: ICD-10-CM

## 2019-12-10 PROCEDURE — ? LIQUID NITROGEN

## 2019-12-10 PROCEDURE — ? COUNSELING

## 2019-12-10 PROCEDURE — 17003 DESTRUCT PREMALG LES 2-14: CPT

## 2019-12-10 PROCEDURE — 17000 DESTRUCT PREMALG LESION: CPT

## 2019-12-10 PROCEDURE — ? FULL BODY SKIN EXAM - DECLINED

## 2019-12-10 PROCEDURE — 99213 OFFICE O/P EST LOW 20 MIN: CPT | Mod: 25

## 2019-12-10 ASSESSMENT — LOCATION SIMPLE DESCRIPTION DERM
LOCATION SIMPLE: POSTERIOR NECK
LOCATION SIMPLE: RIGHT CHEEK
LOCATION SIMPLE: LEFT NOSE
LOCATION SIMPLE: LEFT EAR
LOCATION SIMPLE: RIGHT EAR
LOCATION SIMPLE: RIGHT NOSE

## 2019-12-10 ASSESSMENT — LOCATION DETAILED DESCRIPTION DERM
LOCATION DETAILED: LEFT NASAL SIDEWALL
LOCATION DETAILED: MID POSTERIOR NECK
LOCATION DETAILED: RIGHT CENTRAL MALAR CHEEK
LOCATION DETAILED: RIGHT NASAL SIDEWALL
LOCATION DETAILED: RIGHT SUPERIOR HELIX
LOCATION DETAILED: LEFT SUPERIOR HELIX

## 2019-12-10 ASSESSMENT — LOCATION ZONE DERM
LOCATION ZONE: EAR
LOCATION ZONE: NOSE
LOCATION ZONE: FACE
LOCATION ZONE: NECK

## 2020-02-26 ENCOUNTER — NURSE TRIAGE (OUTPATIENT)
Dept: OTHER | Facility: CLINIC | Age: 75
End: 2020-02-26

## 2020-02-26 NOTE — TELEPHONE ENCOUNTER
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

## 2020-03-10 ENCOUNTER — HOSPITAL ENCOUNTER (EMERGENCY)
Age: 75
Discharge: HOME | End: 2020-03-10
Payer: MEDICARE

## 2020-03-10 ENCOUNTER — APPOINTMENT (OUTPATIENT)
Dept: CT IMAGING | Age: 75
End: 2020-03-10
Payer: MEDICARE

## 2020-03-10 ENCOUNTER — APPOINTMENT (OUTPATIENT)
Dept: GENERAL RADIOLOGY | Age: 75
End: 2020-03-10
Payer: MEDICARE

## 2020-03-10 VITALS
DIASTOLIC BLOOD PRESSURE: 67 MMHG | OXYGEN SATURATION: 97 % | SYSTOLIC BLOOD PRESSURE: 150 MMHG | RESPIRATION RATE: 15 BRPM | HEIGHT: 63 IN | WEIGHT: 250 LBS | HEART RATE: 79 BPM | TEMPERATURE: 98.3 F | BODY MASS INDEX: 44.3 KG/M2

## 2020-03-10 DIAGNOSIS — R07.89 ATYPICAL CHEST PAIN: Primary | ICD-10-CM

## 2020-03-10 LAB
ALBUMIN SERPL-MCNC: 4 G/DL (ref 3.2–4.6)
ALBUMIN/GLOB SERPL: 1.3 {RATIO} (ref 1.2–3.5)
ALP SERPL-CCNC: 92 U/L (ref 50–136)
ALT SERPL-CCNC: 23 U/L (ref 12–65)
ANION GAP SERPL CALC-SCNC: 7 MMOL/L (ref 7–16)
AST SERPL-CCNC: 22 U/L (ref 15–37)
ATRIAL RATE: 89 BPM
BASOPHILS # BLD: 0 K/UL (ref 0–0.2)
BASOPHILS NFR BLD: 0 % (ref 0–2)
BILIRUB SERPL-MCNC: 0.6 MG/DL (ref 0.2–1.1)
BUN SERPL-MCNC: 16 MG/DL (ref 8–23)
CALCIUM SERPL-MCNC: 9.2 MG/DL (ref 8.3–10.4)
CALCULATED P AXIS, ECG09: 62 DEGREES
CALCULATED R AXIS, ECG10: 75 DEGREES
CALCULATED T AXIS, ECG11: 40 DEGREES
CHLORIDE SERPL-SCNC: 104 MMOL/L (ref 98–107)
CO2 SERPL-SCNC: 28 MMOL/L (ref 21–32)
CREAT SERPL-MCNC: 0.68 MG/DL (ref 0.6–1)
DIAGNOSIS, 93000: NORMAL
DIFFERENTIAL METHOD BLD: ABNORMAL
EOSINOPHIL # BLD: 0.2 K/UL (ref 0–0.8)
EOSINOPHIL NFR BLD: 3 % (ref 0.5–7.8)
ERYTHROCYTE [DISTWIDTH] IN BLOOD BY AUTOMATED COUNT: 15 % (ref 11.9–14.6)
GLOBULIN SER CALC-MCNC: 3.2 G/DL (ref 2.3–3.5)
GLUCOSE SERPL-MCNC: 145 MG/DL (ref 65–100)
HCT VFR BLD AUTO: 40.5 % (ref 35.8–46.3)
HGB BLD-MCNC: 13 G/DL (ref 11.7–15.4)
IMM GRANULOCYTES # BLD AUTO: 0 K/UL (ref 0–0.5)
IMM GRANULOCYTES NFR BLD AUTO: 1 % (ref 0–5)
LYMPHOCYTES # BLD: 1.1 K/UL (ref 0.5–4.6)
LYMPHOCYTES NFR BLD: 17 % (ref 13–44)
MCH RBC QN AUTO: 26.3 PG (ref 26.1–32.9)
MCHC RBC AUTO-ENTMCNC: 32.1 G/DL (ref 31.4–35)
MCV RBC AUTO: 82 FL (ref 79.6–97.8)
MONOCYTES # BLD: 0.4 K/UL (ref 0.1–1.3)
MONOCYTES NFR BLD: 6 % (ref 4–12)
NEUTS SEG # BLD: 4.9 K/UL (ref 1.7–8.2)
NEUTS SEG NFR BLD: 73 % (ref 43–78)
NRBC # BLD: 0 K/UL (ref 0–0.2)
P-R INTERVAL, ECG05: 138 MS
PLATELET # BLD AUTO: 126 K/UL (ref 150–450)
PMV BLD AUTO: 10.5 FL (ref 9.4–12.3)
POTASSIUM SERPL-SCNC: 3.9 MMOL/L (ref 3.5–5.1)
PROT SERPL-MCNC: 7.2 G/DL (ref 6.3–8.2)
Q-T INTERVAL, ECG07: 350 MS
QRS DURATION, ECG06: 84 MS
QTC CALCULATION (BEZET), ECG08: 425 MS
RBC # BLD AUTO: 4.94 M/UL (ref 4.05–5.2)
SODIUM SERPL-SCNC: 139 MMOL/L (ref 136–145)
TROPONIN I BLD-MCNC: 0 NG/ML (ref 0.02–0.05)
TROPONIN I SERPL-MCNC: <0.02 NG/ML (ref 0.02–0.05)
VENTRICULAR RATE, ECG03: 89 BPM
WBC # BLD AUTO: 6.7 K/UL (ref 4.3–11.1)

## 2020-03-10 PROCEDURE — 96374 THER/PROPH/DIAG INJ IV PUSH: CPT

## 2020-03-10 PROCEDURE — 71260 CT THORAX DX C+: CPT

## 2020-03-10 PROCEDURE — 84484 ASSAY OF TROPONIN QUANT: CPT

## 2020-03-10 PROCEDURE — 85025 COMPLETE CBC W/AUTO DIFF WBC: CPT

## 2020-03-10 PROCEDURE — 74011000258 HC RX REV CODE- 258

## 2020-03-10 PROCEDURE — 71045 X-RAY EXAM CHEST 1 VIEW: CPT

## 2020-03-10 PROCEDURE — 74011636320 HC RX REV CODE- 636/320

## 2020-03-10 PROCEDURE — 93005 ELECTROCARDIOGRAM TRACING: CPT

## 2020-03-10 PROCEDURE — 80053 COMPREHEN METABOLIC PANEL: CPT

## 2020-03-10 PROCEDURE — 74011250636 HC RX REV CODE- 250/636

## 2020-03-10 PROCEDURE — 96375 TX/PRO/DX INJ NEW DRUG ADDON: CPT

## 2020-03-10 PROCEDURE — 99284 EMERGENCY DEPT VISIT MOD MDM: CPT

## 2020-03-10 RX ORDER — ONDANSETRON 2 MG/ML
4 INJECTION INTRAMUSCULAR; INTRAVENOUS
Status: COMPLETED | OUTPATIENT
Start: 2020-03-10 | End: 2020-03-10

## 2020-03-10 RX ORDER — MORPHINE SULFATE 4 MG/ML
4 INJECTION INTRAVENOUS
Status: COMPLETED | OUTPATIENT
Start: 2020-03-10 | End: 2020-03-10

## 2020-03-10 RX ORDER — SODIUM CHLORIDE 0.9 % (FLUSH) 0.9 %
10 SYRINGE (ML) INJECTION
Status: COMPLETED | OUTPATIENT
Start: 2020-03-10 | End: 2020-03-10

## 2020-03-10 RX ADMIN — IOPAMIDOL 100 ML: 755 INJECTION, SOLUTION INTRAVENOUS at 07:26

## 2020-03-10 RX ADMIN — SODIUM CHLORIDE 100 ML: 900 INJECTION, SOLUTION INTRAVENOUS at 07:26

## 2020-03-10 RX ADMIN — Medication 10 ML: at 07:26

## 2020-03-10 RX ADMIN — ONDANSETRON 4 MG: 2 INJECTION INTRAMUSCULAR; INTRAVENOUS at 06:54

## 2020-03-10 RX ADMIN — MORPHINE SULFATE 4 MG: 4 INJECTION INTRAVENOUS at 06:54

## 2020-03-10 NOTE — ED PROVIDER NOTES
79-year-old female complaining of left-sided chest pain. 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 Patient's had chest pain in the past she states at but is never felt this bad. .    Patient has been worked up in 2018 for chest pain the the results are as follows:  CONCLUSION:   1. Stress EKG: Normal.   2. SPECT Perfusion Imaging: Normal Perfusion. 3. LV Systolic Function is  normal.   4. Risk Assessment:  Low Risk Scan. Chest Pain    This is a new problem. The current episode started less than 1 hour ago. The problem has been gradually improving. The problem occurs constantly. The pain is associated with normal activity (Bending over). The pain is at a severity of 2/10. The pain is mild.         Past Medical History:   Diagnosis Date    Arthritis     OA- knees    GERD (gastroesophageal reflux disease)     diet controlled - very rare    H/O detached retina repair     right eye    Macular hole of right eye     Nausea & vomiting     after anesthesia-none with 2/13 eye surgery-given benadryl in preop for post nasal drip with success    Obesity     Status post total left knee replacement 12/7/2015    Unspecified adverse effect of anesthesia     takes longer to wake- pt states low tolerance for medications       Past Surgical History:   Procedure Laterality Date    HX APPENDECTOMY  1980's    with hysterectomy    HX CATARACT REMOVAL  10/20/11    bilateral with lens implant    HX HYSTERECTOMY  1980's    partial hysterectomy & appendectomy    HX KNEE REPLACEMENT Left 12/2017    HX RETINAL DETACHMENT REPAIR      right side    HX RETINAL DETACHMENT REPAIR      macular hole repair right side     HX RETINAL DETACHMENT REPAIR      left macular hole 2/6/13    HX TUBAL LIGATION  1980's         Family History:   Problem Relation Age of Onset    Diabetes Mother         late onset type 2    Cancer Mother kidney    Hypertension Father     Heart Disease Father 39        CABG    Cancer Brother         pancreatic, bowel       Social History     Socioeconomic History    Marital status:      Spouse name: Not on file    Number of children: Not on file    Years of education: Not on file    Highest education level: Not on file   Occupational History    Not on file   Social Needs    Financial resource strain: Not on file    Food insecurity:     Worry: Not on file     Inability: Not on file    Transportation needs:     Medical: Not on file     Non-medical: Not on file   Tobacco Use    Smoking status: Former Smoker     Packs/day: 1.00     Years: 20.00     Pack years: 20.00     Last attempt to quit: 1984     Years since quittin.2    Smokeless tobacco: Never Used   Substance and Sexual Activity    Alcohol use: No    Drug use: No    Sexual activity: Not on file   Lifestyle    Physical activity:     Days per week: Not on file     Minutes per session: Not on file    Stress: Not on file   Relationships    Social connections:     Talks on phone: Not on file     Gets together: Not on file     Attends Holiness service: Not on file     Active member of club or organization: Not on file     Attends meetings of clubs or organizations: Not on file     Relationship status: Not on file    Intimate partner violence:     Fear of current or ex partner: Not on file     Emotionally abused: Not on file     Physically abused: Not on file     Forced sexual activity: Not on file   Other Topics Concern    Not on file   Social History Narrative    , lives with . She works as  at [employer]. ALLERGIES: Augmentin [amoxicillin-pot clavulanate]    Review of Systems   Constitutional: Negative. Negative for activity change. HENT: Negative. Eyes: Negative. Respiratory: Negative. Cardiovascular: Positive for chest pain. Gastrointestinal: Negative. Genitourinary: Negative. Musculoskeletal: Negative. Skin: Negative. Neurological: Negative. Psychiatric/Behavioral: Negative. All other systems reviewed and are negative. There were no vitals filed for this visit. Physical Exam  Vitals signs and nursing note reviewed. Constitutional:       General: She is not in acute distress. Appearance: She is well-developed. She is not diaphoretic. HENT:      Head: Normocephalic and atraumatic. Right Ear: External ear normal.      Left Ear: External ear normal.      Nose: Nose normal.      Mouth/Throat:      Pharynx: No oropharyngeal exudate. Eyes:      General: No scleral icterus. Right eye: No discharge. Left eye: No discharge. Conjunctiva/sclera: Conjunctivae normal.      Pupils: Pupils are equal, round, and reactive to light. Neck:      Musculoskeletal: Normal range of motion and neck supple. Vascular: No JVD. Trachea: No tracheal deviation. Cardiovascular:      Rate and Rhythm: Normal rate and regular rhythm. Pulmonary:      Effort: Pulmonary effort is normal. No respiratory distress. Breath sounds: Normal breath sounds. No stridor. No wheezing. Chest:      Chest wall: No tenderness. Abdominal:      General: Bowel sounds are normal. There is no distension. Palpations: Abdomen is soft. There is no mass. Tenderness: There is no abdominal tenderness. Musculoskeletal: Normal range of motion. General: No tenderness. Skin:     General: Skin is warm and dry. Coloration: Skin is not pale. Findings: No erythema or rash. Neurological:      Mental Status: She is alert and oriented to person, place, and time. Cranial Nerves: No cranial nerve deficit. Psychiatric:         Behavior: Behavior normal.         Thought Content:  Thought content normal.          MDM  Number of Diagnoses or Management Options  Diagnosis management comments: First troponin was negative chest pain is very atypical.  Second troponin pending if CT negative will send home follow-up PCP       Amount and/or Complexity of Data Reviewed  Clinical lab tests: ordered and reviewed  Tests in the radiology section of CPT®: ordered and reviewed  Tests in the medicine section of CPT®: ordered and reviewed           Procedures

## 2020-03-10 NOTE — LETTER
[address] [address] EMERGENCY DEPT 
[address] [facility] 
Blaise Patrick [place] [id number] [phone] Work/School Note Date: 3/10/2020 To Whom It May concern: 
 
Giselle Dunn was seen and treated today in the emergency room by the following provider(s): 
Attending Provider: Jovita Andujar MD. Giselle Dunn Special Instructions:  Please excuse from shift tonight.   
 
Sincerely, 
 
 
 
 
Yaima Cagle RN

## 2020-03-10 NOTE — ED NOTES
I have reviewed discharge instructions with the patient. The patient verbalized understanding. Patient left ED via Discharge Method: ambulatory to Home with (). Opportunity for questions and clarification provided. Patient given 0 scripts. To continue your aftercare when you leave the hospital, you may receive an automated call from our care team to check in on how you are doing. This is a free service and part of our promise to provide the best care and service to meet your aftercare needs.  If you have questions, or wish to unsubscribe from this service please call [phone]. Thank you for Choosing our [address] [place] [address] Emergency Department.

## 2020-03-18 PROBLEM — R07.2 PRECORDIAL PAIN: Status: ACTIVE | Noted: 2018-03-20

## 2020-03-18 PROBLEM — R93.89 ABNORMAL CT OF THE CHEST: Status: ACTIVE | Noted: 2020-03-18

## 2020-03-21 ENCOUNTER — HOSPITAL ENCOUNTER (EMERGENCY)
Age: 75
Discharge: HOME | End: 2020-03-21
Attending: EMERGENCY MEDICINE
Payer: MEDICARE

## 2020-03-21 ENCOUNTER — NURSE TRIAGE (OUTPATIENT)
Dept: OTHER | Facility: CLINIC | Age: 75
End: 2020-03-21

## 2020-03-21 ENCOUNTER — APPOINTMENT (OUTPATIENT)
Dept: GENERAL RADIOLOGY | Age: 75
End: 2020-03-21
Attending: EMERGENCY MEDICINE
Payer: MEDICARE

## 2020-03-21 VITALS
OXYGEN SATURATION: 96 % | HEIGHT: 63 IN | HEART RATE: 76 BPM | TEMPERATURE: 97.7 F | RESPIRATION RATE: 16 BRPM | WEIGHT: 249 LBS | BODY MASS INDEX: 44.12 KG/M2 | DIASTOLIC BLOOD PRESSURE: 68 MMHG | SYSTOLIC BLOOD PRESSURE: 168 MMHG

## 2020-03-21 DIAGNOSIS — S20.212A CONTUSION OF LEFT CHEST WALL, INITIAL ENCOUNTER: ICD-10-CM
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
## 2020-03-21 PROCEDURE — 99283 EMERGENCY DEPT VISIT LOW MDM: CPT

## 2020-03-21 PROCEDURE — 74011250636 HC RX REV CODE- 250/636: Performed by: EMERGENCY MEDICINE

## 2020-03-21 PROCEDURE — 96372 THER/PROPH/DIAG INJ SC/IM: CPT

## 2020-03-21 PROCEDURE — 71100 X-RAY EXAM RIBS UNI 2 VIEWS: CPT

## 2020-03-21 PROCEDURE — 73562 X-RAY EXAM OF KNEE 3: CPT

## 2020-03-21 PROCEDURE — 71120 X-RAY EXAM BREASTBONE 2/>VWS: CPT

## 2020-03-21 RX ORDER — KETOROLAC TROMETHAMINE 30 MG/ML
30 INJECTION, SOLUTION INTRAMUSCULAR; INTRAVENOUS
Status: COMPLETED | OUTPATIENT
Start: 2020-03-21 | End: 2020-03-21

## 2020-03-21 RX ADMIN — KETOROLAC TROMETHAMINE 30 MG: 30 INJECTION, SOLUTION INTRAMUSCULAR at 20:39

## 2020-03-21 NOTE — ED PROVIDER NOTES
Chase Cervantes is a 76 y.o. female seen on 3/21/2020 at 7:37 PM in the [address] [place] [address] in room ER07/07. Chief Complaint   Patient presents with   Trenia Fossa     HPI: 80-year-old female presenting to the emergency department for evaluation after a fall. 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  She is complaining of left anterior chest wall pain. She also bumped her knee when she fell. She complained of pain in her left knee which has a history of a knee replacement. She is not on any blood thinners. She did not hit her head, there was no loss of consciousness. She is otherwise well-appearing and has no complaints. She denies SOB. Historian: Patient    REVIEW OF SYSTEMS     Review of Systems   Constitutional: Negative for fever. HENT: Negative. Eyes: Negative. Respiratory: Negative for cough, chest tightness, shortness of breath and wheezing. Gastrointestinal: Negative for abdominal distention, abdominal pain, constipation, diarrhea and vomiting. Endocrine: Negative. Genitourinary: Negative for dysuria, flank pain, frequency and urgency. Neurological: Negative for dizziness, syncope and headaches. Psychiatric/Behavioral: Negative. All other systems reviewed and are negative.       PAST MEDICAL HISTORY     Past Medical History:   Diagnosis Date    Arthritis     OA- knees    GERD (gastroesophageal reflux disease)     diet controlled - very rare    H/O detached retina repair     right eye    Macular hole of right eye     Nausea & vomiting     after anesthesia-none with 2/13 eye surgery-given benadryl in preop for post nasal drip with success    Obesity     Status post total left knee replacement 12/7/2015    Unspecified adverse effect of anesthesia     takes longer to wake- pt states low tolerance for medications     Past Surgical History:   Procedure Laterality Date    HX APPENDECTOMY      with hysterectomy    HX CATARACT REMOVAL  10/20/11    bilateral with lens implant    HX HYSTERECTOMY      partial hysterectomy & appendectomy    HX KNEE REPLACEMENT Left 2017    HX RETINAL DETACHMENT REPAIR      right side    HX RETINAL DETACHMENT REPAIR      macular hole repair right side     HX RETINAL DETACHMENT REPAIR      left macular hole 13    HX TUBAL LIGATION       Social History     Socioeconomic History    Marital status:      Spouse name: Not on file    Number of children: Not on file    Years of education: Not on file    Highest education level: Not on file   Tobacco Use    Smoking status: Former Smoker     Packs/day: 1.00     Years: 20.00     Pack years: 20.00     Last attempt to quit: 1984     Years since quittin.2    Smokeless tobacco: Never Used   Substance and Sexual Activity    Alcohol use: No    Drug use: No   Social History Narrative    , lives with . She works as  at [employer]. Prior to Admission Medications   Prescriptions Last Dose Informant Patient Reported? Taking?   metoprolol tartrate (LOPRESSOR) 50 mg tablet   No No   Sig: Take one po q day starting 5 days before CT scan of heart   vitamin F-Z-J-lutein-minerals (OCUVITE) tablet   Yes No   Sig: daily. Facility-Administered Medications: None     Allergies   Allergen Reactions    Augmentin [Amoxicillin-Pot Clavulanate] Nausea and Vomiting        PHYSICAL EXAM       Vitals:    20 1905 20 1916   BP: 177/77    Pulse: 78    Resp: 16    Temp: 97.7 °F (36.5 °C)    SpO2: 96% 96%    Vital signs were reviewed. Physical Exam  Vitals signs reviewed. Constitutional:       General: She is not in acute distress. Appearance: She is well-developed. She is not diaphoretic. HENT:      Head: Normocephalic and atraumatic. Eyes:      Pupils: Pupils are equal, round, and reactive to light.    Neck: Musculoskeletal: Normal range of motion and neck supple. Cardiovascular:      Rate and Rhythm: Normal rate and regular rhythm. Heart sounds: Normal heart sounds. No murmur. No friction rub. No gallop. Pulmonary:      Effort: Pulmonary effort is normal. No respiratory distress. Breath sounds: Normal breath sounds. No stridor. No wheezing. Comments: No crepitus, no stepoffs  Chest:      Chest wall: Tenderness (sternum, L anterior chest wall ribs 4-7) present. Abdominal:      General: Bowel sounds are normal. There is no distension. Palpations: Abdomen is soft. There is no mass. Tenderness: There is no abdominal tenderness. There is no guarding or rebound. Musculoskeletal: Normal range of motion. General: No tenderness or deformity. Right knee: Normal.      Left knee: She exhibits normal range of motion, no swelling, no effusion, no ecchymosis and no deformity. No tenderness found. No medial joint line, no lateral joint line, no MCL, no LCL and no patellar tendon tenderness noted. Comments: Anterior scar over L knee   Skin:     General: Skin is warm and dry. Findings: No erythema or rash. Neurological:      Mental Status: She is alert and oriented to person, place, and time. Sensory: No sensory deficit. Comments: No focal neuro deficits   Psychiatric:         Behavior: Behavior normal.          MEDICAL DECISION MAKING     ED Course: Imaging is unremarkable, patient is well-appearing, appropriate for outpatient management. We have discussed conservative treatment with anti-inflammatory pain medication. Return to ER for worsening symptoms including increasing pain or shortness of breath. She is comfortable with this plan. Disposition: Discharge home  Diagnosis: Left anterior chest wall contusion  ____________________________________________________________________  A portion of this note was generated using voice recognition dictation software. While the note has been reviewed for accuracy, please note certain words and phrases may not be transcribed as intended and some grammatical and/or typographical errors may be present.

## 2020-03-21 NOTE — ED TRIAGE NOTES
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 Pt with left breast/left sided chest pain where she hit the tube as well as left knee pain. Pt reports history of knee replacement. Pt denies dizziness/chest pain. Pt denies LOC.     Serene Rose RN

## 2020-03-22 NOTE — ED NOTES
I have reviewed discharge instructions with the patient. The patient verbalized understanding. Patient left ED via Discharge Method: ambulatory to Home with . Opportunity for questions and clarification provided. Patient given 0 scripts. To continue your aftercare when you leave the hospital, you may receive an automated call from our care team to check in on how you are doing. This is a free service and part of our promise to provide the best care and service to meet your aftercare needs.  If you have questions, or wish to unsubscribe from this service please call [phone]. Thank you for Choosing our [facility] Emergency Department.

## 2020-03-22 NOTE — TELEPHONE ENCOUNTER
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     toradol and aleve prn  Current pain level 2/10  Pain easing and tolerable  CXR done all looked good    Left knee  Xray knee looked ok  No bruising or broken skin  No swelling  Pain subsided-mild pain level 1/10  Icing    Broken skin on left thumb, washed, neosporin and bandaid    Home care was given for aleve bid by MD    Reason for Disposition   [1] Chest wall swelling, bruise or pain AND [2] present < 7 days    Protocols used: CHEST INJURY-ADULT-

## 2020-05-04 ENCOUNTER — HOSPITAL ENCOUNTER (OUTPATIENT)
Dept: LAB | Age: 75
Discharge: HOME | End: 2020-05-04
Payer: MEDICARE

## 2020-05-04 DIAGNOSIS — D47.Z9 LOW GRADE B CELL LYMPHOPROLIFERATIVE DISORDER (HCC): ICD-10-CM

## 2020-05-04 DIAGNOSIS — R16.1 SPLENOMEGALY: ICD-10-CM

## 2020-05-04 LAB
ALBUMIN SERPL-MCNC: 3.6 G/DL (ref 3.2–4.6)
ALBUMIN/GLOB SERPL: 1.2 {RATIO} (ref 1.2–3.5)
ALP SERPL-CCNC: 74 U/L (ref 50–136)
ALT SERPL-CCNC: 21 U/L (ref 12–65)
ANION GAP SERPL CALC-SCNC: 3 MMOL/L (ref 7–16)
AST SERPL-CCNC: 18 U/L (ref 15–37)
BASOPHILS # BLD: 0 K/UL (ref 0–0.2)
BASOPHILS NFR BLD: 1 % (ref 0–2)
BILIRUB SERPL-MCNC: 0.5 MG/DL (ref 0.2–1.1)
BUN SERPL-MCNC: 17 MG/DL (ref 8–23)
CALCIUM SERPL-MCNC: 9 MG/DL (ref 8.3–10.4)
CHLORIDE SERPL-SCNC: 108 MMOL/L (ref 98–107)
CO2 SERPL-SCNC: 29 MMOL/L (ref 21–32)
CREAT SERPL-MCNC: 0.63 MG/DL (ref 0.6–1)
DIFFERENTIAL METHOD BLD: ABNORMAL
EOSINOPHIL # BLD: 0.1 K/UL (ref 0–0.8)
EOSINOPHIL NFR BLD: 2 % (ref 0.5–7.8)
ERYTHROCYTE [DISTWIDTH] IN BLOOD BY AUTOMATED COUNT: 14.3 % (ref 11.9–14.6)
GLOBULIN SER CALC-MCNC: 2.9 G/DL (ref 2.3–3.5)
GLUCOSE SERPL-MCNC: 108 MG/DL (ref 65–100)
HCT VFR BLD AUTO: 37.4 % (ref 35.8–46.3)
HGB BLD-MCNC: 12.2 G/DL (ref 11.7–15.4)
IMM GRANULOCYTES # BLD AUTO: 0 K/UL (ref 0–0.5)
IMM GRANULOCYTES NFR BLD AUTO: 1 % (ref 0–5)
LDH SERPL L TO P-CCNC: 173 U/L (ref 110–210)
LYMPHOCYTES # BLD: 0.8 K/UL (ref 0.5–4.6)
LYMPHOCYTES NFR BLD: 15 % (ref 13–44)
MCH RBC QN AUTO: 26.4 PG (ref 26.1–32.9)
MCHC RBC AUTO-ENTMCNC: 32.6 G/DL (ref 31.4–35)
MCV RBC AUTO: 81 FL (ref 79.6–97.8)
MONOCYTES # BLD: 0.3 K/UL (ref 0.1–1.3)
MONOCYTES NFR BLD: 6 % (ref 4–12)
NEUTS SEG # BLD: 3.8 K/UL (ref 1.7–8.2)
NEUTS SEG NFR BLD: 75 % (ref 43–78)
NRBC # BLD: 0 K/UL (ref 0–0.2)
PLATELET # BLD AUTO: 115 K/UL (ref 150–450)
PMV BLD AUTO: 9.4 FL (ref 9.4–12.3)
POTASSIUM SERPL-SCNC: 4.3 MMOL/L (ref 3.5–5.1)
PROT SERPL-MCNC: 6.5 G/DL (ref 6.3–8.2)
RBC # BLD AUTO: 4.62 M/UL (ref 4.05–5.25)
SODIUM SERPL-SCNC: 140 MMOL/L (ref 136–145)
WBC # BLD AUTO: 5.1 K/UL (ref 4.3–11.1)

## 2020-05-04 PROCEDURE — 36415 COLL VENOUS BLD VENIPUNCTURE: CPT

## 2020-05-04 PROCEDURE — 88184 FLOWCYTOMETRY/ TC 1 MARKER: CPT

## 2020-05-04 PROCEDURE — 83615 LACTATE (LD) (LDH) ENZYME: CPT

## 2020-05-04 PROCEDURE — 80053 COMPREHEN METABOLIC PANEL: CPT

## 2020-05-04 PROCEDURE — 88185 FLOWCYTOMETRY/TC ADD-ON: CPT

## 2020-05-04 PROCEDURE — 85025 COMPLETE CBC W/AUTO DIFF WBC: CPT

## 2020-05-11 LAB
FLOW CYTOMETRY, FBTC1: NORMAL
SPECIMEN SOURCE: NORMAL
TEST ORDERED:: NORMAL

## 2020-06-09 ENCOUNTER — APPOINTMENT (RX ONLY)
Dept: URBAN - NONMETROPOLITAN AREA CLINIC 1 | Facility: CLINIC | Age: 75
Setting detail: DERMATOLOGY
End: 2020-06-09

## 2020-06-09 DIAGNOSIS — R20.8 OTHER DISTURBANCES OF SKIN SENSATION: ICD-10-CM

## 2020-06-09 PROCEDURE — ? ADDITIONAL NOTES

## 2020-06-09 PROCEDURE — 99213 OFFICE O/P EST LOW 20 MIN: CPT

## 2020-06-09 PROCEDURE — ? FULL BODY SKIN EXAM - DECLINED

## 2020-06-09 PROCEDURE — ? COUNSELING

## 2020-06-09 NOTE — PROCEDURE: ADDITIONAL NOTES
Detail Level: Simple
Additional Notes: Patient consent was obtained to proceed with the visit and recommended plan of care after discussion of all risks and benefits, including the risks of COVID-19 exposure.
Additional Notes: Provider states pt needs a referral to an ENT ,unable to visualize ear canal

## 2020-06-09 NOTE — PROCEDURE: MIPS QUALITY
Quality 110: Preventive Care And Screening: Influenza Immunization: Influenza Immunization previously received during influenza season
Quality 226: Preventive Care And Screening: Tobacco Use: Screening And Cessation Intervention: Patient screened for tobacco use and is an ex/non-smoker
Quality 111:Pneumonia Vaccination Status For Older Adults: Pneumococcal Vaccination Previously Received
Detail Level: Detailed
Quality 130: Documentation Of Current Medications In The Medical Record: Current Medications Documented
Quality 431: Preventive Care And Screening: Unhealthy Alcohol Use - Screening: Patient screened for unhealthy alcohol use using a single question and scores less than 2 times per year

## 2020-06-09 NOTE — HPI: SKIN LESION
What Type Of Note Output Would You Prefer (Optional)?: Bullet Format
How Severe Is Your Skin Lesion?: moderate
Has Your Skin Lesion Been Treated?: been treated
Is This A New Presentation, Or A Follow-Up?: Skin Lesion
Additional History: States her ear has a mild ache .

## 2022-03-18 PROBLEM — D69.6 THROMBOCYTOPENIA (HCC): Status: ACTIVE | Noted: 2018-03-28

## 2022-03-19 PROBLEM — R16.1 SPLENOMEGALY: Status: ACTIVE | Noted: 2018-03-20

## 2022-03-19 PROBLEM — R07.2 PRECORDIAL PAIN: Status: ACTIVE | Noted: 2018-03-20

## 2022-03-19 PROBLEM — R93.89 ABNORMAL CT OF THE CHEST: Status: ACTIVE | Noted: 2020-03-18

## 2022-03-20 PROBLEM — D47.Z9 LOW GRADE B CELL LYMPHOPROLIFERATIVE DISORDER (HCC): Status: ACTIVE | Noted: 2018-04-24

## 2023-01-18 ENCOUNTER — APPOINTMENT (RX ONLY)
Dept: URBAN - NONMETROPOLITAN AREA CLINIC 1 | Facility: CLINIC | Age: 78
Setting detail: DERMATOLOGY
End: 2023-01-18

## 2023-01-18 DIAGNOSIS — L21.8 OTHER SEBORRHEIC DERMATITIS: ICD-10-CM | Status: INADEQUATELY CONTROLLED

## 2023-01-18 DIAGNOSIS — L57.0 ACTINIC KERATOSIS: ICD-10-CM | Status: INADEQUATELY CONTROLLED

## 2023-01-18 PROCEDURE — ? COUNSELING

## 2023-01-18 PROCEDURE — ? PRESCRIPTION MEDICATION MANAGEMENT

## 2023-01-18 PROCEDURE — ? MEDICATION COUNSELING

## 2023-01-18 PROCEDURE — 99214 OFFICE O/P EST MOD 30 MIN: CPT

## 2023-01-18 PROCEDURE — ? PRESCRIPTION

## 2023-01-18 PROCEDURE — ? MDM - TREATMENT GOALS

## 2023-01-18 RX ORDER — CLOBETASOL PROPIONATE 0.5 MG/ML
SOLUTION TOPICAL
Qty: 50 | Refills: 1 | Status: ERX | COMMUNITY
Start: 2023-01-18

## 2023-01-18 RX ORDER — KETOCONAZOLE 20 MG/ML
SHAMPOO, SUSPENSION TOPICAL AS DIRECTED
Qty: 120 | Refills: 2 | Status: ERX | COMMUNITY
Start: 2023-01-18

## 2023-01-18 RX ORDER — FLUOROURACIL 5 MG/G
CREAM TOPICAL
Qty: 40 | Refills: 1 | Status: ERX | COMMUNITY
Start: 2023-01-18

## 2023-01-18 RX ADMIN — KETOCONAZOLE: 20 SHAMPOO, SUSPENSION TOPICAL at 00:00

## 2023-01-18 RX ADMIN — FLUOROURACIL: 5 CREAM TOPICAL at 00:00

## 2023-01-18 RX ADMIN — CLOBETASOL PROPIONATE: 0.5 SOLUTION TOPICAL at 00:00

## 2023-01-18 ASSESSMENT — LOCATION DETAILED DESCRIPTION DERM
LOCATION DETAILED: MID-OCCIPITAL SCALP
LOCATION DETAILED: RIGHT OCCIPITAL SCALP
LOCATION DETAILED: LEFT OCCIPITAL SCALP
LOCATION DETAILED: NASAL DORSUM

## 2023-01-18 ASSESSMENT — LOCATION ZONE DERM
LOCATION ZONE: SCALP
LOCATION ZONE: NOSE

## 2023-01-18 ASSESSMENT — LOCATION SIMPLE DESCRIPTION DERM
LOCATION SIMPLE: NOSE
LOCATION SIMPLE: POSTERIOR SCALP

## 2023-01-18 NOTE — PROCEDURE: PRESCRIPTION MEDICATION MANAGEMENT
Initiate Treatment: 5FU cream apply aa nose bid x2 weeks
Render In Strict Bullet Format?: No
Detail Level: Zone

## 2023-01-18 NOTE — HPI: ITCHING (SCALP)
How Did The Scalp Condition Occur?: sudden in onset (over a period of weeks to a few months)
How Severe Is The Scalp Condition?: moderate
Additional History: Patient has B cell lymphoma. Enlarged spleen. Patient states her scalp it very itchy and rash x3-4 weeks. Has used nystatin cream on scalp from her previous vaginal yeast infection which was treated by Sujata hess w/ nystatin cream . Tried dial soap on scalp which she thought it would help. Nothing is working. Photos in chart.

## 2023-01-18 NOTE — PROCEDURE: MEDICATION COUNSELING
Karen Pinedo Benzoyl Peroxide Counseling: Patient counseled that medicine may cause skin irritation and bleach clothing.  In the event of skin irritation, the patient was advised to reduce the amount of the drug applied or use it less frequently.   The patient verbalized understanding of the proper use and possible adverse effects of benzoyl peroxide.  All of the patient's questions and concerns were addressed.

## 2023-01-18 NOTE — PROCEDURE: MIPS QUALITY
Quality 431: Preventive Care And Screening: Unhealthy Alcohol Use - Screening: Patient identified as an unhealthy alcohol user when screened for unhealthy alcohol use using a systematic screening method and received brief counseling
Quality 111:Pneumonia Vaccination Status For Older Adults: Pneumococcal vaccine (PPSV23) administered on or after patient’s 60th birthday and before the end of the measurement period
Quality 226: Preventive Care And Screening: Tobacco Use: Screening And Cessation Intervention: Patient screened for tobacco use and is an ex/non-smoker
Detail Level: Detailed

## 2023-01-18 NOTE — HPI: SKIN LESION
What Type Of Note Output Would You Prefer (Optional)?: Bullet Format
How Severe Is Your Skin Lesion?: moderate
Has Your Skin Lesion Been Treated?: not been treated
Is This A New Presentation, Or A Follow-Up?: Growth
Additional History: Patient has a growth on the rt side of nose she noticed 3 months ago and she would like to rule out a cancer. Photo in chart

## 2023-01-23 ENCOUNTER — RX ONLY (OUTPATIENT)
Age: 78
Setting detail: RX ONLY
End: 2023-01-23

## 2023-01-23 RX ORDER — FLUOROURACIL 5 MG/G
CREAM TOPICAL
Qty: 40 | Refills: 1 | Status: ERX

## 2024-06-10 ENCOUNTER — TELEPHONE (OUTPATIENT)
Dept: ONCOLOGY | Age: 79
End: 2024-06-10

## 2024-06-10 NOTE — TELEPHONE ENCOUNTER
Received call from patient regarding cancelling her upcoming new patient appointment.  Pt states she is going to be seen at [place].  Will forward message to intake/scheduling team to cancel accordingly per patient request.

## 2024-06-13 ENCOUNTER — APPOINTMENT (RX ONLY)
Dept: URBAN - NONMETROPOLITAN AREA CLINIC 1 | Facility: CLINIC | Age: 79
Setting detail: DERMATOLOGY
End: 2024-06-13

## 2024-06-13 DIAGNOSIS — L82.1 OTHER SEBORRHEIC KERATOSIS: ICD-10-CM

## 2024-06-13 PROBLEM — D48.5 NEOPLASM OF UNCERTAIN BEHAVIOR OF SKIN: Status: ACTIVE | Noted: 2024-06-13

## 2024-06-13 PROCEDURE — 11102 TANGNTL BX SKIN SINGLE LES: CPT

## 2024-06-13 PROCEDURE — 99212 OFFICE O/P EST SF 10 MIN: CPT | Mod: 25

## 2024-06-13 PROCEDURE — ? BIOPSY BY SHAVE METHOD

## 2024-06-13 PROCEDURE — ? COUNSELING

## 2024-06-13 ASSESSMENT — LOCATION SIMPLE DESCRIPTION DERM
LOCATION SIMPLE: NOSE
LOCATION SIMPLE: LEFT NOSE
LOCATION SIMPLE: LEFT CHEEK

## 2024-06-13 ASSESSMENT — LOCATION DETAILED DESCRIPTION DERM
LOCATION DETAILED: NASAL ROOT
LOCATION DETAILED: LEFT NASAL SIDEWALL
LOCATION DETAILED: LEFT SUPERIOR LATERAL MALAR CHEEK

## 2024-06-13 ASSESSMENT — LOCATION ZONE DERM
LOCATION ZONE: FACE
LOCATION ZONE: NOSE

## 2025-02-12 NOTE — HPI: SKIN LESION
What Type Of Note Output Would You Prefer (Optional)?: Bullet Format
How Severe Is Your Skin Lesion?: moderate
Has Your Skin Lesion Been Treated?: not been treated
Is This A New Presentation, Or A Follow-Up?: Skin Lesions
no